# Patient Record
Sex: FEMALE | Race: WHITE | NOT HISPANIC OR LATINO | Employment: OTHER | URBAN - METROPOLITAN AREA
[De-identification: names, ages, dates, MRNs, and addresses within clinical notes are randomized per-mention and may not be internally consistent; named-entity substitution may affect disease eponyms.]

---

## 2018-11-30 ENCOUNTER — TELEPHONE (OUTPATIENT)
Dept: ENDOCRINOLOGY | Facility: HOSPITAL | Age: 78
End: 2018-11-30

## 2018-11-30 ENCOUNTER — OFFICE VISIT (OUTPATIENT)
Dept: ENDOCRINOLOGY | Facility: HOSPITAL | Age: 78
End: 2018-11-30
Payer: COMMERCIAL

## 2018-11-30 VITALS
BODY MASS INDEX: 39.91 KG/M2 | HEIGHT: 57 IN | SYSTOLIC BLOOD PRESSURE: 148 MMHG | HEART RATE: 76 BPM | DIASTOLIC BLOOD PRESSURE: 82 MMHG | WEIGHT: 185 LBS

## 2018-11-30 DIAGNOSIS — C73 THYROID CANCER (HCC): Primary | ICD-10-CM

## 2018-11-30 DIAGNOSIS — E89.0 POSTOPERATIVE HYPOTHYROIDISM: ICD-10-CM

## 2018-11-30 PROCEDURE — 99204 OFFICE O/P NEW MOD 45 MIN: CPT | Performed by: INTERNAL MEDICINE

## 2018-11-30 RX ORDER — LISINOPRIL 10 MG/1
20 TABLET ORAL DAILY
Refills: 1 | COMMUNITY
Start: 2018-09-17

## 2018-11-30 RX ORDER — BUDESONIDE AND FORMOTEROL FUMARATE DIHYDRATE 160; 4.5 UG/1; UG/1
AEROSOL RESPIRATORY (INHALATION)
Refills: 5 | COMMUNITY
Start: 2018-10-08

## 2018-11-30 RX ORDER — ALENDRONATE SODIUM 70 MG/1
70 TABLET ORAL WEEKLY
Refills: 3 | COMMUNITY
Start: 2018-10-08

## 2018-11-30 RX ORDER — LEVOTHYROXINE SODIUM 112 UG/1
112 TABLET ORAL DAILY
Refills: 3 | COMMUNITY
Start: 2018-11-08 | End: 2018-11-30 | Stop reason: SDUPTHER

## 2018-11-30 RX ORDER — LEVOTHYROXINE SODIUM 112 UG/1
112 TABLET ORAL DAILY
Qty: 90 TABLET | Refills: 3 | Status: SHIPPED | OUTPATIENT
Start: 2018-11-30 | End: 2019-12-05 | Stop reason: SDUPTHER

## 2018-11-30 RX ORDER — SIMVASTATIN 20 MG
20 TABLET ORAL
Refills: 1 | COMMUNITY
Start: 2018-09-27

## 2018-11-30 RX ORDER — PREDNISOLONE ACETATE 10 MG/ML
1 SUSPENSION/ DROPS OPHTHALMIC 2 TIMES DAILY
COMMUNITY

## 2018-11-30 NOTE — LETTER
November 30, 2018     Beth Chapin DO  [facility] [address]    Patient: Herb Ortega   YOB: 1940   Date of Visit: 11/30/2018       Dear Dr Vladimir Avitia: Thank you for referring Jamel Garcia to me for evaluation  Below are my notes for this consultation  If you have questions, please do not hesitate to call me  I look forward to following your patient along with you  Sincerely,        Hiwot Galvin DO        CC: No Recipients  Hiwot Galvin DO  11/30/2018  9:41 AM  Sign at close encounter  11/30/2018    Assessment/Plan      Diagnoses and all orders for this visit:    Thyroid cancer ([facility] [address] )  -     Thyroglobulin; Future  -     Anti-thyroglobulin antibody; Future  -     US thyroid; Future  -     Thyroglobulin; Future  -     Anti-thyroglobulin antibody; Future    Postoperative hypothyroidism  -     TSH, 3rd generation Lab Collect; Future  -     T4, free Lab Collect; Future  -     levothyroxine 112 mcg tablet; Take 1 tablet (112 mcg total) by mouth daily    Other orders  -     lisinopril (ZESTRIL) 10 mg tablet; Take 10 mg by mouth daily  -     simvastatin (ZOCOR) 20 mg tablet; Take 20 mg by mouth daily at bedtime  -     Discontinue: levothyroxine 112 mcg tablet; Take 112 mcg by mouth daily  -     alendronate (FOSAMAX) 70 mg tablet; Take 70 mg by mouth once a week  -     SYMBICORT 160-4 5 MCG/ACT inhaler; 2 PUFF INHALATION DAILY  -     prednisoLONE acetate (PRED FORTE) 1 % ophthalmic suspension; 1 drop 2 (two) times a day        Assessment/Plan:  1  Papillary thyroid cancer: The lab did not run a thyroglobulin quantitative level  I have asked the patient to go back for thyroglobulin quantitative and thyroglobulin antibody level  We will call her with these results  As long as this level looks negative, we will plan to see her back in 1 year with a TSH, free T4, thyroglobulin quantitative, thyroglobulin antibody, neck ultrasound just prior    Will acquire surgical pathology from [facility]     2   Hypothyroidism:  Continue levothyroxine 112 mcg daily  CC:   Thyroid cancer    History of Present Illness     HPI: Ashley Tang is a 69y o  year old female with history of hyperlipidemia, asthma, hypertension, postoperative hypothyroidism, osteoporosis, papillary thyroid cancer presents to establish care  Previously followed with doctor Hernandez  In 2016 she had a neck mass noted  She had a thyroid nodule biopsied showing papillary thyroid cancer  She then underwent subtotal thyroidectomy at [facility]   She did not receive radioactive iodine  She has then underwent monitoring with ultrasound evaluations and blood work  She presents today to establish care  She does have a history of breast cancer 2009 with treatment including radiation  Overall she feels okay  She denies any changes in how she is feeling and denies new health issues  She states there is some stress in her life lately as her son who she lives with his currently going through some health issues and over a year ago she had her house for close  She follows with the retina specialist as well as her family doctor closely  She denies known family history of thyroid cancer  She denies any neck compressive symptoms  She denies any symptoms referable to hypothyroidism or hyperthyroidism  She is maintained on levothyroxine 112 mcg daily  Review of Systems   Constitutional: Negative for fatigue  HENT: Negative for trouble swallowing and voice change  Eyes: Negative for visual disturbance  Respiratory: Negative for shortness of breath  Cardiovascular: Negative for palpitations and leg swelling  Gastrointestinal: Negative for abdominal pain, nausea and vomiting  Endocrine: Negative for cold intolerance, heat intolerance, polydipsia and polyuria  Musculoskeletal: Negative for arthralgias and myalgias  Skin: Negative for rash     Neurological: Negative for dizziness, tremors and weakness  Hematological: Negative for adenopathy  Psychiatric/Behavioral: Negative for agitation and confusion  Historical Information   History reviewed  No pertinent past medical history  History reviewed  No pertinent surgical history  Social History   History   Alcohol use Not on file     History   Drug use: Unknown     History   Smoking Status    Never Smoker   Smokeless Tobacco    Never Used     Family History:   Family History   Problem Relation Age of Onset    Heart disease Mother     Heart disease Father     Lupus Sister        Meds/Allergies   Current Outpatient Prescriptions   Medication Sig Dispense Refill    alendronate (FOSAMAX) 70 mg tablet Take 70 mg by mouth once a week  3    levothyroxine 112 mcg tablet Take 1 tablet (112 mcg total) by mouth daily 90 tablet 3    lisinopril (ZESTRIL) 10 mg tablet Take 10 mg by mouth daily  1    prednisoLONE acetate (PRED FORTE) 1 % ophthalmic suspension 1 drop 2 (two) times a day      simvastatin (ZOCOR) 20 mg tablet Take 20 mg by mouth daily at bedtime  1    SYMBICORT 160-4 5 MCG/ACT inhaler 2 PUFF INHALATION DAILY  5     No current facility-administered medications for this visit  Allergies   Allergen Reactions    Amoxicillin GI Intolerance       Objective   Vitals: Blood pressure 148/82, pulse 76, height 4' 9" (1 448 m), weight 83 9 kg (185 lb)  Invasive Devices          No matching active lines, drains, or airways          Physical Exam   Constitutional: She is oriented to person, place, and time  She appears well-developed and well-nourished  No distress  HENT:   Head: Normocephalic and atraumatic  Eyes: Pupils are equal, round, and reactive to light  Conjunctivae are normal    Neck: Normal range of motion  Neck supple  No thyromegaly present  Cardiovascular: Normal rate and regular rhythm  Pulmonary/Chest: Effort normal and breath sounds normal  No respiratory distress  Abdominal: Soft   Bowel sounds are normal  She exhibits no distension  Musculoskeletal: Normal range of motion  She exhibits no edema  Neurological: She is alert and oriented to person, place, and time  She exhibits normal muscle tone  Skin: Skin is warm and dry  No rash noted  She is not diaphoretic  Psychiatric: She has a normal mood and affect  Her behavior is normal    Vitals reviewed  The history was obtained from the review of the chart and from the patient  Lab Results:      Ultrasound of the neck from 10/30/2018 at [facility]:  Residual thyroid tissue in the left thyroid bed measuring 1 1 x 0 4 x 0 5 cm  Three lymph nodes measure in the right neck in 3 lymph nodes measure in the left neck  Labs from [address] [place] [address] from 10/30/2018:  Glucose 125, BUN 16, creatinine 0 75, GFR 77, sodium 143, potassium 4 1, albumin 4 2, bilirubin 0 3, alk phos 88, AST 17, ALT 13, TSH 0 851, total T4 11 4, free thyroxine index 3 1, thyroglobulin antibody less than 1  No future appointments  Portions of the record may have been created with voice recognition software  Occasional wrong word or "sound a like" substitutions may have occurred due to the inherent limitations of voice recognition software  Read the chart carefully and recognize, using context, where substitutions have occurred

## 2018-11-30 NOTE — TELEPHONE ENCOUNTER
Spoke to someone at the PCP office and they are faxing what they have  We will have it on your desk but the person I talked to didn't seem to know what she was looking for  If it's not what you need I will call [facility] Monday

## 2018-11-30 NOTE — PROGRESS NOTES
11/30/2018    Assessment/Plan      Diagnoses and all orders for this visit:    Thyroid cancer ([facility] [address] )  -     Thyroglobulin; Future  -     Anti-thyroglobulin antibody; Future  -     US thyroid; Future  -     Thyroglobulin; Future  -     Anti-thyroglobulin antibody; Future    Postoperative hypothyroidism  -     TSH, 3rd generation Lab Collect; Future  -     T4, free Lab Collect; Future  -     levothyroxine 112 mcg tablet; Take 1 tablet (112 mcg total) by mouth daily    Other orders  -     lisinopril (ZESTRIL) 10 mg tablet; Take 10 mg by mouth daily  -     simvastatin (ZOCOR) 20 mg tablet; Take 20 mg by mouth daily at bedtime  -     Discontinue: levothyroxine 112 mcg tablet; Take 112 mcg by mouth daily  -     alendronate (FOSAMAX) 70 mg tablet; Take 70 mg by mouth once a week  -     SYMBICORT 160-4 5 MCG/ACT inhaler; 2 PUFF INHALATION DAILY  -     prednisoLONE acetate (PRED FORTE) 1 % ophthalmic suspension; 1 drop 2 (two) times a day        Assessment/Plan:  1  Papillary thyroid cancer: The lab did not run a thyroglobulin quantitative level  I have asked the patient to go back for thyroglobulin quantitative and thyroglobulin antibody level  We will call her with these results  As long as this level looks negative, we will plan to see her back in 1 year with a TSH, free T4, thyroglobulin quantitative, thyroglobulin antibody, neck ultrasound just prior  Will acquire surgical pathology from [facility]     2   Hypothyroidism:  Continue levothyroxine 112 mcg daily  ADDENDUM: Surgical pathology 11/17/16:   Thyroid lobectomy in subtotal left lobectomy specimen:  Papillary thyroid cancer 3 2 cm of right lobe confined to the thyroid  Margins are free of tumor  No lymphovascular invasion  Nodular goiter of right lobe  Architecture was classical papillary  There was no capsular invasion and no lymph:  Vascular invasion and no extrathyroidal extension      CC:   Thyroid cancer    History of Present Illness HPI: Doyle Barragan is a 69y o  year old female with history of hyperlipidemia, asthma, hypertension, postoperative hypothyroidism, osteoporosis, papillary thyroid cancer presents to establish care  Previously followed with doctor David  In 2016 she had a neck mass noted  She had a thyroid nodule biopsied showing papillary thyroid cancer  She then underwent subtotal thyroidectomy at [facility]   She did not receive radioactive iodine  She has then underwent monitoring with ultrasound evaluations and blood work  She presents today to establish care  She does have a history of breast cancer 2009 with treatment including radiation  Overall she feels okay  She denies any changes in how she is feeling and denies new health issues  She states there is some stress in her life lately as her son who she lives with his currently going through some health issues and over a year ago she had her house for close  She follows with the retina specialist as well as her family doctor closely  She denies known family history of thyroid cancer  She denies any neck compressive symptoms  She denies any symptoms referable to hypothyroidism or hyperthyroidism  She is maintained on levothyroxine 112 mcg daily  Review of Systems   Constitutional: Negative for fatigue  HENT: Negative for trouble swallowing and voice change  Eyes: Negative for visual disturbance  Respiratory: Negative for shortness of breath  Cardiovascular: Negative for palpitations and leg swelling  Gastrointestinal: Negative for abdominal pain, nausea and vomiting  Endocrine: Negative for cold intolerance, heat intolerance, polydipsia and polyuria  Musculoskeletal: Negative for arthralgias and myalgias  Skin: Negative for rash  Neurological: Negative for dizziness, tremors and weakness  Hematological: Negative for adenopathy  Psychiatric/Behavioral: Negative for agitation and confusion         Historical Information   History reviewed  No pertinent past medical history  History reviewed  No pertinent surgical history  Social History   History   Alcohol use Not on file     History   Drug use: Unknown     History   Smoking Status    Never Smoker   Smokeless Tobacco    Never Used     Family History:   Family History   Problem Relation Age of Onset    Heart disease Mother     Heart disease Father     Lupus Sister        Meds/Allergies   Current Outpatient Prescriptions   Medication Sig Dispense Refill    alendronate (FOSAMAX) 70 mg tablet Take 70 mg by mouth once a week  3    levothyroxine 112 mcg tablet Take 1 tablet (112 mcg total) by mouth daily 90 tablet 3    lisinopril (ZESTRIL) 10 mg tablet Take 10 mg by mouth daily  1    prednisoLONE acetate (PRED FORTE) 1 % ophthalmic suspension 1 drop 2 (two) times a day      simvastatin (ZOCOR) 20 mg tablet Take 20 mg by mouth daily at bedtime  1    SYMBICORT 160-4 5 MCG/ACT inhaler 2 PUFF INHALATION DAILY  5     No current facility-administered medications for this visit  Allergies   Allergen Reactions    Amoxicillin GI Intolerance       Objective   Vitals: Blood pressure 148/82, pulse 76, height 4' 9" (1 448 m), weight 83 9 kg (185 lb)  Invasive Devices          No matching active lines, drains, or airways          Physical Exam   Constitutional: She is oriented to person, place, and time  She appears well-developed and well-nourished  No distress  HENT:   Head: Normocephalic and atraumatic  Eyes: Pupils are equal, round, and reactive to light  Conjunctivae are normal    Neck: Normal range of motion  Neck supple  No thyromegaly present  Cardiovascular: Normal rate and regular rhythm  Pulmonary/Chest: Effort normal and breath sounds normal  No respiratory distress  Abdominal: Soft  Bowel sounds are normal  She exhibits no distension  Musculoskeletal: Normal range of motion  She exhibits no edema     Neurological: She is alert and oriented to person, place, and time  She exhibits normal muscle tone  Skin: Skin is warm and dry  No rash noted  She is not diaphoretic  Psychiatric: She has a normal mood and affect  Her behavior is normal    Vitals reviewed  The history was obtained from the review of the chart and from the patient  Lab Results:      Ultrasound of the neck from 10/30/2018 at [facility]:  Residual thyroid tissue in the left thyroid bed measuring 1 1 x 0 4 x 0 5 cm  Three lymph nodes measure in the right neck in 3 lymph nodes measure in the left neck  Labs from [address] [place] [address] from 10/30/2018:  Glucose 125, BUN 16, creatinine 0 75, GFR 77, sodium 143, potassium 4 1, albumin 4 2, bilirubin 0 3, alk phos 88, AST 17, ALT 13, TSH 0 851, total T4 11 4, free thyroxine index 3 1, thyroglobulin antibody less than 1  No future appointments  Portions of the record may have been created with voice recognition software  Occasional wrong word or "sound a like" substitutions may have occurred due to the inherent limitations of voice recognition software  Read the chart carefully and recognize, using context, where substitutions have occurred

## 2018-12-12 LAB
THYROGLOB AB SERPL-ACNC: <1 IU/ML (ref 0–0.9)
THYROGLOB SERPL-MCNC: <2 NG/ML

## 2019-03-15 ENCOUNTER — TELEPHONE (OUTPATIENT)
Dept: ENDOCRINOLOGY | Facility: CLINIC | Age: 79
End: 2019-03-15

## 2019-04-19 ENCOUNTER — TELEPHONE (OUTPATIENT)
Dept: ENDOCRINOLOGY | Facility: HOSPITAL | Age: 79
End: 2019-04-19

## 2019-07-16 ENCOUNTER — TELEPHONE (OUTPATIENT)
Dept: ENDOCRINOLOGY | Facility: CLINIC | Age: 79
End: 2019-07-16

## 2019-11-27 LAB
T4 FREE SERPL-MCNC: 2.02 NG/DL (ref 0.82–1.77)
THYROGLOB AB SERPL-ACNC: <1 IU/ML (ref 0–0.9)
THYROGLOB SERPL-MCNC: 1.5 NG/ML (ref 1.5–38.5)
TSH SERPL DL<=0.005 MIU/L-ACNC: 0.3 UIU/ML (ref 0.45–4.5)

## 2019-12-05 ENCOUNTER — OFFICE VISIT (OUTPATIENT)
Dept: ENDOCRINOLOGY | Facility: HOSPITAL | Age: 79
End: 2019-12-05
Payer: COMMERCIAL

## 2019-12-05 VITALS
BODY MASS INDEX: 37.88 KG/M2 | SYSTOLIC BLOOD PRESSURE: 128 MMHG | WEIGHT: 175.6 LBS | HEART RATE: 80 BPM | DIASTOLIC BLOOD PRESSURE: 66 MMHG | HEIGHT: 57 IN

## 2019-12-05 DIAGNOSIS — E89.0 POSTOPERATIVE HYPOTHYROIDISM: Primary | ICD-10-CM

## 2019-12-05 DIAGNOSIS — C73 THYROID CANCER (HCC): ICD-10-CM

## 2019-12-05 PROCEDURE — 99214 OFFICE O/P EST MOD 30 MIN: CPT | Performed by: INTERNAL MEDICINE

## 2019-12-05 RX ORDER — LEVOTHYROXINE SODIUM 112 UG/1
112 TABLET ORAL DAILY
Qty: 90 TABLET | Refills: 3 | Status: SHIPPED | OUTPATIENT
Start: 2019-12-05 | End: 2020-06-24 | Stop reason: SDUPTHER

## 2019-12-05 NOTE — PROGRESS NOTES
12/5/2019    Assessment/Plan      Diagnoses and all orders for this visit:    Postoperative hypothyroidism  -     TSH, 3rd generation; Future  -     T4, free; Future  -     levothyroxine 112 mcg tablet; Take 1 tablet (112 mcg total) by mouth daily    Thyroid cancer ([address] )  -     US head neck lymph node mapping; Future  -     Thyroglobulin; Future  -     Anti-thyroglobulin antibody; Future        Assessment/Plan:  1  Papillary thyroid cancer:  Thyroglobulin remains low  Prior laboratory values could only detect values to a limit of less than 2  The laboratory sensitivity appears to have improved and now is reading at 1 5 which I would still consider low especially in the setting of not having radioactive iodine as well as having residual thyroid tissue  Ultrasound evaluation suggested enlarged an abnormal lymph nodes  I wonder if this is more related to allergies as well as recent infection  I have asked her to have her lymph node mapping repeated at [facility] so I can review with our radiologist prior to sending her for a biopsy  I discussed that is reassuring that I am not palpating any lymphadenopathy as well as her thyroglobulin remains low  We will continue her TSH suppression at the current level until things sort themselves out  If the ultrasound suggest a biopsy, we will ranges  If it does not will repeat labs as ordered above prior to next appointment which will be in 4 months  2  Postoperative hypothyroidism:  Plan as above  CC:  Follow-up    History of Present Illness     HPI: Pili Lopez is a 67y o  year old female with history of papillary thyroid cancer as well as postoperative hypothyroidism presents for a follow-up appointment  In 2016, she had a neck mass noted  She underwent biopsy which showed papillary thyroid cancer    Subtotal thyroidectomy was done on 11/17/2016 at [facility] with specimen showing thyroid lobectomy and subtotal left lobectomy with a papillary thyroid cancer 3 2 cm in the right lobe confined to the thyroid with the margins free of tumor  There was no lymphovascular invasion  The right thyroid showed nodular goiter  Architecture was classical papillary thyroid cancer  No capsular invasion and no lymph nodes involved or vascular invasion and no extrathyroidal extension  She did not receive radioactive iodine treatment  Of note she does have a history of breast cancer in 2009 which included radiation  She is maintained on levothyroxine 112 mcg daily  Presents today overall feeling well  She has had a recent viral upper respiratory infection as well as allergies over the fall  No neck compressive symptoms  She has had a raspy voice related to her allergies which she gets often  Review of Systems   Constitutional: Negative for fatigue  HENT: Negative for trouble swallowing and voice change  Eyes: Negative for visual disturbance  Respiratory: Negative for shortness of breath  Cardiovascular: Negative for palpitations and leg swelling  Gastrointestinal: Negative for abdominal pain, nausea and vomiting  Endocrine: Negative for polydipsia and polyuria  Musculoskeletal: Negative for arthralgias and myalgias  Skin: Negative for rash  Neurological: Negative for dizziness, tremors and weakness  Hematological: Negative for adenopathy  Psychiatric/Behavioral: Negative for agitation and confusion  Historical Information   History reviewed  No pertinent past medical history  History reviewed  No pertinent surgical history    Social History   Social History     Substance and Sexual Activity   Alcohol Use Not on file     Social History     Substance and Sexual Activity   Drug Use Not on file     Social History     Tobacco Use   Smoking Status Never Smoker   Smokeless Tobacco Never Used     Family History:   Family History   Problem Relation Age of Onset    Heart disease Mother     Heart disease Father     Lupus Sister Meds/Allergies   Current Outpatient Medications   Medication Sig Dispense Refill    levothyroxine 112 mcg tablet Take 1 tablet (112 mcg total) by mouth daily 90 tablet 3    lisinopril (ZESTRIL) 10 mg tablet Take 20 mg by mouth daily   1    prednisoLONE acetate (PRED FORTE) 1 % ophthalmic suspension 1 drop 2 (two) times a day      simvastatin (ZOCOR) 20 mg tablet Take 20 mg by mouth daily at bedtime  1    SYMBICORT 160-4 5 MCG/ACT inhaler 2 PUFF INHALATION DAILY  5    alendronate (FOSAMAX) 70 mg tablet Take 70 mg by mouth once a week  3     No current facility-administered medications for this visit  Allergies   Allergen Reactions    Amoxicillin GI Intolerance       Objective   Vitals: Blood pressure 128/66, pulse 80, height 4' 9" (1 448 m), weight 79 7 kg (175 lb 9 6 oz)  Invasive Devices     None                 Physical Exam   Constitutional: She is oriented to person, place, and time  She appears well-developed and well-nourished  No distress  HENT:   Head: Normocephalic and atraumatic  Neck: Normal range of motion  Neck supple  No thyromegaly present  Cardiovascular: Normal rate and regular rhythm  Pulmonary/Chest: Effort normal and breath sounds normal  No respiratory distress  Abdominal: Soft  Bowel sounds are normal    Musculoskeletal: Normal range of motion  She exhibits no edema  Neurological: She is alert and oriented to person, place, and time  She exhibits normal muscle tone  Skin: Skin is warm and dry  No rash noted  She is not diaphoretic  Psychiatric: She has a normal mood and affect  Her behavior is normal    Vitals reviewed  The history was obtained from the review of the chart and from the patient      Lab Results:      Recent Results (from the past 29009 hour(s))   Thyroglobulin TG-SALEEM    Collection Time: 12/05/18 11:19 AM   Result Value Ref Range    THYROGLOBULIN (TG-SALEEM) <2 0 ng/mL   Anti-thyroglobulin antibody    Collection Time: 12/05/18 11:19 AM Result Value Ref Range    Thyroglobulin Ab <1 0 0 0 - 0 9 IU/mL   T4, free    Collection Time: 11/26/19  9:12 AM   Result Value Ref Range    Free t4 2 02 (H) 0 82 - 1 77 ng/dL   TSH, 3rd generation    Collection Time: 11/26/19  9:12 AM   Result Value Ref Range    TSH 0 297 (L) 0 450 - 4 500 uIU/mL   TGAB+THYROGLOBULIN,ANNELISE OR LCMS    Collection Time: 11/26/19  9:12 AM   Result Value Ref Range    Thyroglobulin Ab <1 0 0 0 - 0 9 IU/mL   Thyroglobulin by ANNELISE    Collection Time: 11/26/19  9:12 AM   Result Value Ref Range    Thyroglobulin-ANNELISE 1 5 1 5 - 38 5 ng/mL     Ultrasound of the neck done on 11/26/2019 at [facility] showed some abnormal lymph nodes which are new since prior evaluation  No future appointments  Portions of the record may have been created with voice recognition software  Occasional wrong word or "sound a like" substitutions may have occurred due to the inherent limitations of voice recognition software  Read the chart carefully and recognize, using context, where substitutions have occurred

## 2019-12-20 DIAGNOSIS — C73 THYROID CANCER (HCC): Primary | ICD-10-CM

## 2020-02-10 ENCOUNTER — TELEPHONE (OUTPATIENT)
Dept: ENDOCRINOLOGY | Facility: HOSPITAL | Age: 80
End: 2020-02-10

## 2020-02-10 NOTE — TELEPHONE ENCOUNTER
Eduard Nina from Dr Selene Kline office called, she notes that Dr Km Avalos would like the patient to have a Thyrogen stimulated I131 whole body scan done for her recurrent thyroid cancer  They would like to know if this is something we want to set up or if you would like them to set this up for her?

## 2020-02-12 NOTE — TELEPHONE ENCOUNTER
Spoke to Oh Cadet, she would like some clarification on this if you are able to give her a call back  The number for Dr Pati Burroughs office is [phone] Option 1

## 2020-02-12 NOTE — TELEPHONE ENCOUNTER
We can certainly arrange it to be done at [facility] but when I spoke to the patient she was hoping to have it done at [facility] given proximity

## 2020-02-13 ENCOUNTER — TELEPHONE (OUTPATIENT)
Dept: ENDOCRINOLOGY | Facility: HOSPITAL | Age: 80
End: 2020-02-13

## 2020-02-13 NOTE — TELEPHONE ENCOUNTER
Can we reach out to the patient regarding the iodine scan that Dr Montana Cai wants to order? We need to give thyrogen injections prior to the scan but they do not give them at their office and we do not at our office  They would be given through our nuclear medicine department and if that is the case, our nuclear medicine department should just do the scan as well  If the patient is ok with that, I will have the nuclear medicine department reach out to her to arrange

## 2020-02-13 NOTE — TELEPHONE ENCOUNTER
Patient is agreeable to you reaching out to Nuclear Medicine  As long as she knows what her cost would be before she has anything done

## 2020-02-17 ENCOUNTER — HOSPITAL ENCOUNTER (OUTPATIENT)
Dept: NUCLEAR MEDICINE | Facility: HOSPITAL | Age: 80
Discharge: HOME | End: 2020-02-17

## 2020-02-17 DIAGNOSIS — C73 MALIGNANT NEOPLASM OF THYROID GLAND (HCC): ICD-10-CM

## 2020-03-06 ENCOUNTER — TELEPHONE (OUTPATIENT)
Dept: ENDOCRINOLOGY | Facility: HOSPITAL | Age: 80
End: 2020-03-06

## 2020-03-06 DIAGNOSIS — C73 THYROID CANCER (HCC): Primary | ICD-10-CM

## 2020-03-06 NOTE — TELEPHONE ENCOUNTER
Nuc med at [address] [facility] [address] called, they need a script in epic for patient for her thyroglobulin to be drawn  The one in the system has a date of April and there are other labs attached to it  Can a new one be entered without a hold date for nuc med?

## 2020-03-09 ENCOUNTER — HOSPITAL ENCOUNTER (OUTPATIENT)
Dept: NUCLEAR MEDICINE | Facility: HOSPITAL | Age: 80
Discharge: HOME | End: 2020-03-09
Payer: COMMERCIAL

## 2020-03-09 DIAGNOSIS — C73 MALIGNANT NEOPLASM OF THYROID GLAND (HCC): ICD-10-CM

## 2020-03-09 PROCEDURE — 78018 THYROID MET IMAGING BODY: CPT

## 2020-03-09 PROCEDURE — A9509 IODINE I-123 SOD IODIDE MIL: HCPCS

## 2020-03-09 RX ADMIN — THYROTROPIN ALFA 0.9 MG: 0.9 INJECTION, POWDER, FOR SOLUTION INTRAMUSCULAR at 09:32

## 2020-03-09 NOTE — NURSING NOTE
Received pt for Thyrogen injection in Nuclear Medicine department  Pt states she has a mild UTI  Nuclear Medicine technologist spoke with Dr Nkechi MCKEON to proceed with scheduled thyrogen injection  Pt is postmenopausal, no pregnancy  Nuclear med tech reviewed testing schedule with patient  Thyrogen injection procedure and possible side effects reviewed with patient  Pt verbalizes understanding of above  Thyrogen reconstituted as per package insert  See MAR for lot number and expiration date  Thyrogen 0 9 mg given IM in right buttocks  Pt tolerated injection well, site is without redness or drainage  Pt verbalizes understanding to return tomorrow for second thyrogen injection

## 2020-03-10 ENCOUNTER — HOSPITAL ENCOUNTER (OUTPATIENT)
Dept: NUCLEAR MEDICINE | Facility: HOSPITAL | Age: 80
Discharge: HOME | End: 2020-03-10
Payer: COMMERCIAL

## 2020-03-10 RX ADMIN — THYROTROPIN ALFA 0.9 MG: 0.9 INJECTION, POWDER, FOR SOLUTION INTRAMUSCULAR at 09:06

## 2020-03-11 ENCOUNTER — HOSPITAL ENCOUNTER (OUTPATIENT)
Dept: NUCLEAR MEDICINE | Facility: HOSPITAL | Age: 80
Discharge: HOME | End: 2020-03-11
Payer: COMMERCIAL

## 2020-03-16 LAB
THYROGLOB AB SERPL-ACNC: <1 IU/ML (ref 0–0.9)
THYROGLOB SERPL-MCNC: 24.1 NG/ML (ref 1.5–38.5)
TSH SERPL DL<=0.005 MIU/L-ACNC: 25.74 UIU/ML (ref 0.45–4.5)

## 2020-04-15 ENCOUNTER — TELEMEDICINE (OUTPATIENT)
Dept: ENDOCRINOLOGY | Facility: HOSPITAL | Age: 80
End: 2020-04-15
Payer: COMMERCIAL

## 2020-04-15 DIAGNOSIS — C73 THYROID CANCER (HCC): Primary | ICD-10-CM

## 2020-04-15 DIAGNOSIS — E89.0 POSTOPERATIVE HYPOTHYROIDISM: ICD-10-CM

## 2020-04-15 PROCEDURE — 99213 OFFICE O/P EST LOW 20 MIN: CPT | Performed by: INTERNAL MEDICINE

## 2020-04-15 RX ORDER — DORZOLAMIDE HYDROCHLORIDE AND TIMOLOL MALEATE 20; 5 MG/ML; MG/ML
SOLUTION/ DROPS OPHTHALMIC
COMMUNITY
Start: 2020-03-23

## 2020-04-15 RX ORDER — BIMATOPROST 0.01 %
DROPS OPHTHALMIC (EYE)
COMMUNITY
Start: 2020-03-24

## 2020-06-12 ENCOUNTER — TELEPHONE (OUTPATIENT)
Dept: ENDOCRINOLOGY | Facility: HOSPITAL | Age: 80
End: 2020-06-12

## 2020-06-15 LAB
T4 FREE SERPL-MCNC: 1.71 NG/DL (ref 0.82–1.77)
THYROGLOB AB SERPL-ACNC: <1 IU/ML (ref 0–0.9)
THYROGLOB SERPL-MCNC: 1.2 NG/ML (ref 1.5–38.5)
TSH SERPL DL<=0.005 MIU/L-ACNC: 0.67 UIU/ML (ref 0.45–4.5)

## 2020-06-24 ENCOUNTER — TELEPHONE (OUTPATIENT)
Dept: ENDOCRINOLOGY | Facility: HOSPITAL | Age: 80
End: 2020-06-24

## 2020-06-24 DIAGNOSIS — C73 THYROID CANCER (HCC): Primary | ICD-10-CM

## 2020-06-24 DIAGNOSIS — E89.0 POSTOPERATIVE HYPOTHYROIDISM: ICD-10-CM

## 2020-06-24 RX ORDER — LEVOTHYROXINE SODIUM 112 UG/1
TABLET ORAL
Qty: 90 TABLET | Refills: 3
Start: 2020-06-24 | End: 2020-07-09 | Stop reason: SDUPTHER

## 2020-07-09 ENCOUNTER — TELEPHONE (OUTPATIENT)
Dept: ENDOCRINOLOGY | Facility: HOSPITAL | Age: 80
End: 2020-07-09

## 2020-07-09 DIAGNOSIS — E89.0 POSTOPERATIVE HYPOTHYROIDISM: ICD-10-CM

## 2020-07-09 RX ORDER — LEVOTHYROXINE SODIUM 112 UG/1
TABLET ORAL
Qty: 90 TABLET | Refills: 0
Start: 2020-07-09 | End: 2020-10-19 | Stop reason: SDUPTHER

## 2020-07-09 NOTE — TELEPHONE ENCOUNTER
I spoke to Dr Thania Mckenzie  He does not feel radioactive iodine is appropriate for treatment of this patient's thyroid cancer which has metastasized to the neck  I called and spoke to the patient and noted that at this time, the 2 main options we have include considering surgical evaluation for local regional disease vs monitoring lab work over time  The patient would really prefer to monitor over time which I think is reasonable  She has labs ordered for before her next appointment which is scheduled in October

## 2020-07-09 NOTE — TELEPHONE ENCOUNTER
Sarai Mo from Dr [facility] office called  He would like to speak to you about this patient  Please call him back at [phone]  FYI:  Don't leave a message if they don't answer  Their machine is not working & they won't get the message

## 2020-07-10 ENCOUNTER — TELEPHONE (OUTPATIENT)
Dept: ENDOCRINOLOGY | Facility: HOSPITAL | Age: 80
End: 2020-07-10

## 2020-07-10 NOTE — TELEPHONE ENCOUNTER
Spoke to patient  I will reach out to [facility] and see if they can help as I am not entirely sure what I can do in this regard

## 2020-07-10 NOTE — TELEPHONE ENCOUNTER
Received call from patient  She requests to speak with you regarding a bill? She said you were aware of this and she could ask for your help when necessary

## 2020-09-29 LAB — THYROGLOB AB SERPL-ACNC: <1 IU/ML (ref 0–0.9)

## 2020-10-01 LAB
THYROGLOB AB SERPL-ACNC: <1 IU/ML (ref 0–0.9)
THYROGLOB SERPL-MCNC: 2.1 NG/ML (ref 1.5–38.5)

## 2020-10-19 ENCOUNTER — OFFICE VISIT (OUTPATIENT)
Dept: ENDOCRINOLOGY | Facility: HOSPITAL | Age: 80
End: 2020-10-19
Payer: COMMERCIAL

## 2020-10-19 VITALS
WEIGHT: 172.2 LBS | DIASTOLIC BLOOD PRESSURE: 68 MMHG | BODY MASS INDEX: 37.15 KG/M2 | TEMPERATURE: 98.7 F | SYSTOLIC BLOOD PRESSURE: 110 MMHG | HEART RATE: 80 BPM | HEIGHT: 57 IN

## 2020-10-19 DIAGNOSIS — C73 THYROID CANCER (HCC): ICD-10-CM

## 2020-10-19 DIAGNOSIS — E89.0 POSTOPERATIVE HYPOTHYROIDISM: Primary | ICD-10-CM

## 2020-10-19 PROCEDURE — 99214 OFFICE O/P EST MOD 30 MIN: CPT | Performed by: INTERNAL MEDICINE

## 2020-10-19 RX ORDER — LEVOTHYROXINE SODIUM 112 UG/1
TABLET ORAL
Qty: 102 TABLET | Refills: 3 | Status: SHIPPED | OUTPATIENT
Start: 2020-10-19

## 2020-10-19 RX ORDER — LEVOTHYROXINE SODIUM 112 UG/1
TABLET ORAL
Qty: 96 TABLET | Refills: 3 | Status: SHIPPED | OUTPATIENT
Start: 2020-10-19 | End: 2020-10-19 | Stop reason: SDUPTHER

## 2020-12-02 ENCOUNTER — TELEPHONE (OUTPATIENT)
Dept: ENDOCRINOLOGY | Facility: HOSPITAL | Age: 80
End: 2020-12-02

## 2022-07-29 ENCOUNTER — DOCUMENTATION (OUTPATIENT)
Dept: HEMATOLOGY ONCOLOGY | Facility: CLINIC | Age: 82
End: 2022-07-29

## 2022-07-29 NOTE — PROGRESS NOTES
Intake received  Pt outreach to follow    08/05/22  Called pt to go over the services we offer got her voicemail left a message for pt to call me back

## 2022-08-01 ENCOUNTER — PATIENT OUTREACH (OUTPATIENT)
Dept: HEMATOLOGY ONCOLOGY | Facility: CLINIC | Age: 82
End: 2022-08-01

## 2022-08-01 NOTE — PROGRESS NOTES
Outside records requested from:[facility]   Pathology completed on:7/12/2022  Fax:[phone]  Phone:[phone]  Imaging completed on:7/2022  Fax:  Phone:[phone] [facility] will be uploaded in system

## 2022-08-04 ENCOUNTER — PATIENT OUTREACH (OUTPATIENT)
Dept: HEMATOLOGY ONCOLOGY | Facility: CLINIC | Age: 82
End: 2022-08-04

## 2022-08-04 NOTE — PROGRESS NOTES
Outside Pathology/Images records received from : [facility] sent to Pathology  attention Ligia Zaragoza or Jesus Galarza  Note routed to team

## 2022-08-05 ENCOUNTER — PATIENT OUTREACH (OUTPATIENT)
Dept: HEMATOLOGY ONCOLOGY | Facility: CLINIC | Age: 82
End: 2022-08-05

## 2022-08-12 ENCOUNTER — PATIENT OUTREACH (OUTPATIENT)
Dept: HEMATOLOGY ONCOLOGY | Facility: CLINIC | Age: 82
End: 2022-08-12

## 2022-08-12 NOTE — PROGRESS NOTES
Past thyroid images did not come through to our system, spoke with Isidro Cisneros at [facility] and she will send them again through [facility], if unable to be sent that way, the images will be mailed to us

## 2022-08-22 ENCOUNTER — TELEPHONE (OUTPATIENT)
Dept: ENDOCRINOLOGY | Facility: CLINIC | Age: 82
End: 2022-08-22

## 2022-08-22 ENCOUNTER — CONSULT (OUTPATIENT)
Dept: SURGICAL ONCOLOGY | Facility: CLINIC | Age: 82
End: 2022-08-22
Payer: COMMERCIAL

## 2022-08-22 VITALS
OXYGEN SATURATION: 99 % | BODY MASS INDEX: 35.38 KG/M2 | DIASTOLIC BLOOD PRESSURE: 82 MMHG | RESPIRATION RATE: 15 BRPM | HEART RATE: 71 BPM | WEIGHT: 164 LBS | SYSTOLIC BLOOD PRESSURE: 128 MMHG | HEIGHT: 57 IN | TEMPERATURE: 98 F

## 2022-08-22 DIAGNOSIS — C73 THYROID CANCER (HCC): Primary | ICD-10-CM

## 2022-08-22 PROCEDURE — 99203 OFFICE O/P NEW LOW 30 MIN: CPT | Performed by: SURGERY

## 2022-08-22 NOTE — LETTER
August 22, 2022     [facility]  [address] [facility]    Patient: Doyle Barragan   YOB: 1940   Date of Visit: 8/22/2022       Dear Dr Madiha Soliz: Thank you for referring Court Earing to me for evaluation  Below are my notes for this consultation  If you have questions, please do not hesitate to call me  I look forward to following your patient along with you  Sincerely,        Jaye Simon MD        CC: DO Rahel Stovall DO Lander Raspberry, MD  8/22/2022  9:47 AM  Sign when Signing Visit               Surgical Oncology Consult       [address] [facility] [place]  [address] [address]  [address] [facility]  1940  [id number]  [address] [facility] SURGICAL ONCOLOGY [facility]  [address] [id number] [phone]    Chief Complaint   Patient presents with    New Patient Visit       Assessment/Plan:    No problem-specific Assessment & Plan notes found for this encounter  Diagnoses and all orders for this visit:    Thyroid cancer ([facility] [address] )  -     US thyroid; Future  -     US head neck lymph node mapping; Future  -     Thyroglobulin, Quant w/ AB ([place]); Future  -     CT soft tissue neck w wo contrast; Future  -     Basic metabolic panel; Future    Other orders  -     fluticasone-salmeterol (ADVAIR HFA) 115-21 MCG/ACT inhaler; Inhale 2 puffs 2 (two) times a day Rinse mouth after use  Advance Care Planning/Advance Directives:  {MDA CLN ACP/AD:67973}    Oncology History   Thyroid cancer ([facility] [address] )   6/28/2021 Surgery    Slides reviewed by [facility] pathology:  1  Slides (6) labeled [id number] from [facility] (obtained on: 11/17/2016):   A   Thyroid, right, lobectomy:   -  Papillary thyroid carcinoma, tall cell variant, with necrosis (high grade papillary carcinoma),3 2 cm, unencapsulated,    - Background thyroid tissue with adenomatous nodule  B  Thyroid, left lobe, subtotal lobectomy:   - Thyroid tissue with adenomatous nodule  2  Slides (3) labeled  from [facility] (obtained on: 01/21/2020)   A  Lymph node, right neck, biopsy:   -  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; 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  8/8/2022 Biopsy    Neck, Soft Tissue Mass, Left neck Ultrasound-guided Needle Biopsy (3 slides [id number] [facility], collected 7/12/2022):  - Papillary thyroid carcinoma, focally invading fibrous tissue  History of Present Illness:  Patient is a 25-year-old woman with history of thyroid cancer treated with right lobectomy and subtotal left lobectomy  Recently, she is had swelling in the left neck which was thought to be a metastatic lymph node  Biopsy confirmed this  She is been referred for evaluation and treatment she did not receive radioactive iodine at time of surgery which was done in 2016  Review of Systems   Constitutional: Negative  HENT: Negative  Eyes: Negative  Respiratory: Negative  Cardiovascular: Negative  Gastrointestinal: Negative  Endocrine: Negative  Genitourinary: Negative  Musculoskeletal: Negative  Skin: Negative  Allergic/Immunologic: Negative  Neurological: Negative  Hematological: Positive for adenopathy  Psychiatric/Behavioral: Negative  Patient Active Problem List   Diagnosis    Postoperative hypothyroidism    Thyroid cancer ([facility] [address] )     No past medical history on file  Past Surgical History:   Procedure Laterality Date    US GUIDED THYROID BIOPSY  11/7/2016     Family History   Problem Relation Age of Onset    Heart disease Mother     Heart disease Father     Lupus Sister      Social History     Socioeconomic History    Marital status:       Spouse name: Not on file    Number of children: Not on file    Years of education: Not on file    Highest education level: Not on file   Occupational History    Not on file   Tobacco Use    Smoking status: Never Smoker    Smokeless tobacco: Never Used   Substance and Sexual Activity    Alcohol use: Not on file    Drug use: Not on file    Sexual activity: Not on file   Other Topics Concern    Not on file   Social History Narrative    Not on file     Social Determinants of Health     Financial Resource Strain: Not on file   Food Insecurity: Not on file   Transportation Needs: Not on file   Physical Activity: Not on file   Stress: Not on file   Social Connections: Not on file   Intimate Partner Violence: Not on file   Housing Stability: Not on file       Current Outpatient Medications:     dorzolamide-timolol (COSOPT) 22 3-6 8 MG/ML ophthalmic solution, INSTILL 1 DROP LEFT EYE 2 TIMES A DAY, Disp: , Rfl:     fluticasone-salmeterol (ADVAIR HFA) 115-21 MCG/ACT inhaler, Inhale 2 puffs 2 (two) times a day Rinse mouth after use , Disp: , Rfl:     levothyroxine 112 mcg tablet, 1 tab daily 6 days of the week, 2 tabs on sundays  , Disp: 102 tablet, Rfl: 3    lisinopril (ZESTRIL) 10 mg tablet, Take 20 mg by mouth daily , Disp: , Rfl: 1    LUMIGAN 0 01 % ophthalmic drops, INSTILL 1 DROP INTO LEFT EYE IN THE EVENING, Disp: , Rfl:     prednisoLONE acetate (PRED FORTE) 1 % ophthalmic suspension, 1 drop 2 (two) times a day, Disp: , Rfl:     simvastatin (ZOCOR) 20 mg tablet, Take 20 mg by mouth daily at bedtime, Disp: , Rfl: 1    alendronate (FOSAMAX) 70 mg tablet, Take 70 mg by mouth once a week (Patient not taking: Reported on 8/22/2022), Disp: , Rfl: 3    SYMBICORT 160-4 5 MCG/ACT inhaler, 2 PUFF INHALATION DAILY (Patient not taking: Reported on 8/22/2022), Disp: , Rfl: 5  Allergies   Allergen Reactions    Amoxicillin GI Intolerance     Vitals:    08/22/22 0849   BP: 128/82   Pulse: 71   Resp: 15   Temp: 98 °F (36 7 °C)   SpO2: 99%       Physical Exam  Constitutional:       Appearance: Normal appearance     HENT:      Head: Normocephalic and atraumatic  Right Ear: External ear normal       Left Ear: External ear normal    Eyes:      Extraocular Movements: Extraocular movements intact  Pupils: Pupils are equal, round, and reactive to light  Cardiovascular:      Rate and Rhythm: Normal rate and regular rhythm  Pulses: Normal pulses  Heart sounds: Normal heart sounds  Pulmonary:      Effort: Pulmonary effort is normal       Breath sounds: Normal breath sounds  Abdominal:      General: Abdomen is flat  Palpations: Abdomen is soft  Musculoskeletal:         General: Normal range of motion  Cervical back: Normal range of motion and neck supple  Skin:     General: Skin is warm and dry  Neurological:      General: No focal deficit present  Mental Status: She is alert and oriented to person, place, and time  Psychiatric:         Mood and Affect: Mood normal          Behavior: Behavior normal          Pathology:  Case Report   Surgical Pathology Report                         Case: [id number]                                    Authorizing Provider: Jose A Fox MD        Collected:           08/08/2022 [facility]               Ordering Location:     [facility]      Received:            08/08/2022 [facility]                                      Hospital Specialty                                                                                   Laboratory                                                                    Pathologist:           Keron Rhoades MD                                                          Specimen:    Neck, Soft Tissue Mass, Left neck Ultrasound-guided Biopsy ( 3 slides [id number], collected 7/12/2022)                                                        Final Diagnosis   A   Neck, Soft Tissue Mass, Left neck Ultrasound-guided Needle Biopsy (3 slides [id number] [facility], collected 7/12/2022):  - Papillary thyroid carcinoma, focally invading fibrous tissue        Electronically signed by Artemio Grier MD on 8/8/2022 at 11:08 AM   Note    I agree with the original pathologist's diagnosis  A copy of the final report is sent to Dr Belinda Levy on 8/8/22 @ 1105 hours  Labs:  Lab Results   Component Value Date    TSH 0 668 06/11/2020         Imaging  Outside CT scan of neck showing palpable mass and left neck increase in size compared to prior studies  This measures 2 8 x 2 6 x 1 7 cm  There are multiple enlarged lymph nodes seen bilaterally  In the right cervical chain there is a node measuring 6 x 15 mm  In the right cervical chain as a 2nd node measuring 2 8 cm  Anterior to delisa there were 2 lymph nodes that are 14 and 11 mm in size  Thyroid gland surgically absent  I reviewed the above laboratory and imaging data  Discussion/Summary:  Patient status post right thyroidectomy and subtotal left hemithyroidectomy based on outside op report  Now with left adenopathy, biopsy-proven papillary cancer  Right side unaccounted for  Patient may need left and/or right neck dissection  Will obtain preoperative imaging and path review prior to committing to surgery  She will follow up here in week after we get update his scans

## 2022-08-22 NOTE — PROGRESS NOTES
Surgical Oncology Consult       [address] [facility] [place]  [address] [place] [address]  [address] [place] [address] SABINE Donohue 89  1940  [id number]  [address] [facility]ARE SURGICAL ONCOLOGY Aparnayari Huang  [address] [facility]  [phone]    Chief Complaint   Patient presents with    New Patient Visit       Assessment/Plan:    No problem-specific Assessment & Plan notes found for this encounter  Diagnoses and all orders for this visit:    Thyroid cancer ([facility] [address] )  -     US thyroid; Future  -     US head neck lymph node mapping; Future  -     Thyroglobulin, Quant w/ AB ([place]); Future  -     CT soft tissue neck w wo contrast; Future  -     Basic metabolic panel; Future    Other orders  -     fluticasone-salmeterol (ADVAIR HFA) 115-21 MCG/ACT inhaler; Inhale 2 puffs 2 (two) times a day Rinse mouth after use  Advance Care Planning/Advance Directives:  Discussed disease status, cancer treatment plans and/or cancer treatment goals with the patient  Oncology History   Thyroid cancer ([facility] [address] )   6/28/2021 Surgery    Slides reviewed by [facility] pathology:  1  Slides (6) labeled [id number] from [facility] (obtained on: 11/17/2016):   A  Thyroid, right, lobectomy:   -  Papillary thyroid carcinoma, tall cell variant, with necrosis (high grade papillary carcinoma),3 2 cm, unencapsulated,    - Background thyroid tissue with adenomatous nodule  B  Thyroid, left lobe, subtotal lobectomy:   - Thyroid tissue with adenomatous nodule  2  Slides (3) labeled  from [facility] (obtained on: 01/21/2020)   A  Lymph node, right neck, biopsy:   -  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; 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        8/8/2022 Biopsy    Neck, Soft Tissue Mass, Left neck Ultrasound-guided Needle Biopsy (3 slides [id number] [facility], collected 7/12/2022):  - Papillary thyroid carcinoma, focally invading fibrous tissue  History of Present Illness:  Patient is a 58-year-old woman with history of thyroid cancer treated with right lobectomy and subtotal left lobectomy  Recently, she is had swelling in the left neck which was thought to be a metastatic lymph node  Biopsy confirmed this  She is been referred for evaluation and treatment she did not receive radioactive iodine at time of surgery which was done in 2016  Review of Systems   Constitutional: Negative  HENT: Negative  Eyes: Negative  Respiratory: Negative  Cardiovascular: Negative  Gastrointestinal: Negative  Endocrine: Negative  Genitourinary: Negative  Musculoskeletal: Negative  Skin: Negative  Allergic/Immunologic: Negative  Neurological: Negative  Hematological: Positive for adenopathy  Psychiatric/Behavioral: Negative  Patient Active Problem List   Diagnosis    Postoperative hypothyroidism    Thyroid cancer ([facility] [address] )     No past medical history on file  Past Surgical History:   Procedure Laterality Date    US GUIDED THYROID BIOPSY  11/7/2016     Family History   Problem Relation Age of Onset    Heart disease Mother     Heart disease Father     Lupus Sister      Social History     Socioeconomic History    Marital status:       Spouse name: Not on file    Number of children: Not on file    Years of education: Not on file    Highest education level: Not on file   Occupational History    Not on file   Tobacco Use    Smoking status: Never Smoker    Smokeless tobacco: Never Used   Substance and Sexual Activity    Alcohol use: Not on file    Drug use: Not on file    Sexual activity: Not on file   Other Topics Concern    Not on file   Social History Narrative    Not on file     Social Determinants of Health     Financial Resource Strain: Not on file   Food Insecurity: Not on file   Transportation Needs: Not on file Physical Activity: Not on file   Stress: Not on file   Social Connections: Not on file   Intimate Partner Violence: Not on file   Housing Stability: Not on file       Current Outpatient Medications:     dorzolamide-timolol (COSOPT) 22 3-6 8 MG/ML ophthalmic solution, INSTILL 1 DROP LEFT EYE 2 TIMES A DAY, Disp: , Rfl:     fluticasone-salmeterol (ADVAIR HFA) 115-21 MCG/ACT inhaler, Inhale 2 puffs 2 (two) times a day Rinse mouth after use , Disp: , Rfl:     levothyroxine 112 mcg tablet, 1 tab daily 6 days of the week, 2 tabs on sundays  , Disp: 102 tablet, Rfl: 3    lisinopril (ZESTRIL) 10 mg tablet, Take 20 mg by mouth daily , Disp: , Rfl: 1    LUMIGAN 0 01 % ophthalmic drops, INSTILL 1 DROP INTO LEFT EYE IN THE EVENING, Disp: , Rfl:     prednisoLONE acetate (PRED FORTE) 1 % ophthalmic suspension, 1 drop 2 (two) times a day, Disp: , Rfl:     simvastatin (ZOCOR) 20 mg tablet, Take 20 mg by mouth daily at bedtime, Disp: , Rfl: 1    alendronate (FOSAMAX) 70 mg tablet, Take 70 mg by mouth once a week (Patient not taking: Reported on 8/22/2022), Disp: , Rfl: 3    SYMBICORT 160-4 5 MCG/ACT inhaler, 2 PUFF INHALATION DAILY (Patient not taking: Reported on 8/22/2022), Disp: , Rfl: 5  Allergies   Allergen Reactions    Amoxicillin GI Intolerance     Vitals:    08/22/22 0849   BP: 128/82   Pulse: 71   Resp: 15   Temp: 98 °F (36 7 °C)   SpO2: 99%       Physical Exam  Constitutional:       Appearance: Normal appearance  HENT:      Head: Normocephalic and atraumatic  Right Ear: External ear normal       Left Ear: External ear normal    Eyes:      Extraocular Movements: Extraocular movements intact  Pupils: Pupils are equal, round, and reactive to light  Cardiovascular:      Rate and Rhythm: Normal rate and regular rhythm  Pulses: Normal pulses  Heart sounds: Normal heart sounds  Pulmonary:      Effort: Pulmonary effort is normal       Breath sounds: Normal breath sounds     Abdominal: General: Abdomen is flat  Palpations: Abdomen is soft  Musculoskeletal:         General: Normal range of motion  Cervical back: Normal range of motion and neck supple  Skin:     General: Skin is warm and dry  Neurological:      General: No focal deficit present  Mental Status: She is alert and oriented to person, place, and time  Psychiatric:         Mood and Affect: Mood normal          Behavior: Behavior normal          Pathology:  Case Report   Surgical Pathology Report                         Case: [id number]                                    Authorizing Provider: Sonu Severino MD        Collected:           08/08/2022 [facility]               Ordering Location:     [facility]      Received:            08/08/2022 [facility]                                      Hospital Specialty                                                                                   Laboratory                                                                    Pathologist:           Mono Samayoa MD                                                          Specimen:    Neck, Soft Tissue Mass, Left neck Ultrasound-guided Biopsy ( 3 slides [id number], collected 7/12/2022)                                                        Final Diagnosis   A  Neck, Soft Tissue Mass, Left neck Ultrasound-guided Needle Biopsy (3 slides [id number] [facility], collected 7/12/2022):  - Papillary thyroid carcinoma, focally invading fibrous tissue        Electronically signed by Mono Samayoa MD on 8/8/2022 at 11:08 AM   Note    I agree with the original pathologist's diagnosis  A copy of the final report is sent to Dr Rahul Serrano on 8/8/22 @ 1105 hours  Labs:  Lab Results   Component Value Date    TSH 0 668 06/11/2020         Imaging  Outside CT scan of neck showing palpable mass and left neck increase in size compared to prior studies  This measures 2 8 x 2 6 x 1 7 cm  There are multiple enlarged lymph nodes seen bilaterally  In the right cervical chain there is a node measuring 6 x 15 mm  In the right cervical chain as a 2nd node measuring 2 8 cm  Anterior to delisa there were 2 lymph nodes that are 14 and 11 mm in size  Thyroid gland surgically absent  I reviewed the above laboratory and imaging data  Discussion/Summary:  Patient status post right thyroidectomy and subtotal left hemithyroidectomy based on outside op report  Now with left adenopathy, biopsy-proven papillary cancer  Right side unaccounted for  Patient may need left and/or right neck dissection  Will obtain preoperative imaging and path review prior to committing to surgery  She will follow up here in week after we get update his scans

## 2022-08-22 NOTE — LETTER
August 22, 2022     [facility]  [address] [facility]    Patient: Weston Regan   YOB: 1940   Date of Visit: 8/22/2022       Dear Dr Leena Mccormack: Thank you for referring Chaya Choe to me for evaluation  Below are my notes for this consultation  If you have questions, please do not hesitate to call me  I look forward to following your patient along with you  Sincerely,        Carey Domingo MD        CC: DO Aj Anaya DO Bonni Alm, MD  8/22/2022  9:48 AM  Sign when Signing Visit               Surgical Oncology Consult       [address] [facility] [address] [address]  [address] [facility],[address]  1940  [id number]  [address] [facility] [address] [id number] [phone]    Chief Complaint   Patient presents with    New Patient Visit       Assessment/Plan:    No problem-specific Assessment & Plan notes found for this encounter  Diagnoses and all orders for this visit:    Thyroid cancer ([facility] [address] )  -     US thyroid; Future  -     US head neck lymph node mapping; Future  -     Thyroglobulin, Quant w/ AB ([place]); Future  -     CT soft tissue neck w wo contrast; Future  -     Basic metabolic panel; Future    Other orders  -     fluticasone-salmeterol (ADVAIR HFA) 115-21 MCG/ACT inhaler; Inhale 2 puffs 2 (two) times a day Rinse mouth after use  Advance Care Planning/Advance Directives:  Discussed disease status, cancer treatment plans and/or cancer treatment goals with the patient  Oncology History   Thyroid cancer ([facility] [address] )   6/28/2021 Surgery    Slides reviewed by [facility] pathology:  1  Slides (6) labeled [id number] from [facility] (obtained on: 11/17/2016):   A   Thyroid, right, lobectomy:   -  Papillary thyroid carcinoma, tall cell variant, with necrosis (high grade papillary carcinoma),3 2 cm, unencapsulated,    - Background thyroid tissue with adenomatous nodule  B  Thyroid, left lobe, subtotal lobectomy:   - Thyroid tissue with adenomatous nodule  2  Slides (3) labeled  from [facility] (obtained on: 01/21/2020)   A  Lymph node, right neck, biopsy:   -  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; 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  8/8/2022 Biopsy    Neck, Soft Tissue Mass, Left neck Ultrasound-guided Needle Biopsy (3 slides [id number] [facility], collected 7/12/2022):  - Papillary thyroid carcinoma, focally invading fibrous tissue  History of Present Illness:  Patient is a 45-year-old woman with history of thyroid cancer treated with right lobectomy and subtotal left lobectomy  Recently, she is had swelling in the left neck which was thought to be a metastatic lymph node  Biopsy confirmed this  She is been referred for evaluation and treatment she did not receive radioactive iodine at time of surgery which was done in 2016  Review of Systems   Constitutional: Negative  HENT: Negative  Eyes: Negative  Respiratory: Negative  Cardiovascular: Negative  Gastrointestinal: Negative  Endocrine: Negative  Genitourinary: Negative  Musculoskeletal: Negative  Skin: Negative  Allergic/Immunologic: Negative  Neurological: Negative  Hematological: Positive for adenopathy  Psychiatric/Behavioral: Negative  Patient Active Problem List   Diagnosis    Postoperative hypothyroidism    Thyroid cancer ([facility] [address] )     No past medical history on file  Past Surgical History:   Procedure Laterality Date    US GUIDED THYROID BIOPSY  11/7/2016     Family History   Problem Relation Age of Onset    Heart disease Mother     Heart disease Father     Lupus Sister      Social History     Socioeconomic History    Marital status:       Spouse name: Not on file    Number of children: Not on file    Years of education: Not on file    Highest education level: Not on file   Occupational History    Not on file   Tobacco Use    Smoking status: Never Smoker    Smokeless tobacco: Never Used   Substance and Sexual Activity    Alcohol use: Not on file    Drug use: Not on file    Sexual activity: Not on file   Other Topics Concern    Not on file   Social History Narrative    Not on file     Social Determinants of Health     Financial Resource Strain: Not on file   Food Insecurity: Not on file   Transportation Needs: Not on file   Physical Activity: Not on file   Stress: Not on file   Social Connections: Not on file   Intimate Partner Violence: Not on file   Housing Stability: Not on file       Current Outpatient Medications:     dorzolamide-timolol (COSOPT) 22 3-6 8 MG/ML ophthalmic solution, INSTILL 1 DROP LEFT EYE 2 TIMES A DAY, Disp: , Rfl:     fluticasone-salmeterol (ADVAIR HFA) 115-21 MCG/ACT inhaler, Inhale 2 puffs 2 (two) times a day Rinse mouth after use , Disp: , Rfl:     levothyroxine 112 mcg tablet, 1 tab daily 6 days of the week, 2 tabs on sundays  , Disp: 102 tablet, Rfl: 3    lisinopril (ZESTRIL) 10 mg tablet, Take 20 mg by mouth daily , Disp: , Rfl: 1    LUMIGAN 0 01 % ophthalmic drops, INSTILL 1 DROP INTO LEFT EYE IN THE EVENING, Disp: , Rfl:     prednisoLONE acetate (PRED FORTE) 1 % ophthalmic suspension, 1 drop 2 (two) times a day, Disp: , Rfl:     simvastatin (ZOCOR) 20 mg tablet, Take 20 mg by mouth daily at bedtime, Disp: , Rfl: 1    alendronate (FOSAMAX) 70 mg tablet, Take 70 mg by mouth once a week (Patient not taking: Reported on 8/22/2022), Disp: , Rfl: 3    SYMBICORT 160-4 5 MCG/ACT inhaler, 2 PUFF INHALATION DAILY (Patient not taking: Reported on 8/22/2022), Disp: , Rfl: 5  Allergies   Allergen Reactions    Amoxicillin GI Intolerance     Vitals:    08/22/22 0849   BP: 128/82   Pulse: 71   Resp: 15   Temp: 98 °F (36 7 °C)   SpO2: 99%       Physical Exam  Constitutional:       Appearance: Normal appearance  HENT:      Head: Normocephalic and atraumatic  Right Ear: External ear normal       Left Ear: External ear normal    Eyes:      Extraocular Movements: Extraocular movements intact  Pupils: Pupils are equal, round, and reactive to light  Cardiovascular:      Rate and Rhythm: Normal rate and regular rhythm  Pulses: Normal pulses  Heart sounds: Normal heart sounds  Pulmonary:      Effort: Pulmonary effort is normal       Breath sounds: Normal breath sounds  Abdominal:      General: Abdomen is flat  Palpations: Abdomen is soft  Musculoskeletal:         General: Normal range of motion  Cervical back: Normal range of motion and neck supple  Skin:     General: Skin is warm and dry  Neurological:      General: No focal deficit present  Mental Status: She is alert and oriented to person, place, and time  Psychiatric:         Mood and Affect: Mood normal          Behavior: Behavior normal          Pathology:  Case Report   Surgical Pathology Report                         Case: [id number]                                    Authorizing Provider: Gardenia Trinh MD        Collected:           08/08/2022 0748               Ordering Location:     [facility]      Received:            08/08/2022 [facility]                                      Hospital Specialty                                                                                   Laboratory                                                                    Pathologist:           Deidre Aceves MD                                                          Specimen:    Neck, Soft Tissue Mass, Left neck Ultrasound-guided Biopsy ( 3 slides [id number], collected 7/12/2022)                                                        Final Diagnosis   A   Neck, Soft Tissue Mass, Left neck Ultrasound-guided Needle Biopsy (3 slides [address], collected 7/12/2022):  - Papillary thyroid carcinoma, focally invading fibrous tissue        Electronically signed by Miguel Leal MD on 8/8/2022 at 11:08 AM   Note    I agree with the original pathologist's diagnosis  A copy of the final report is sent to Dr Ana Smith on 8/8/22 @ 1105 hours  Labs:  Lab Results   Component Value Date    TSH 0 668 06/11/2020         Imaging  Outside CT scan of neck showing palpable mass and left neck increase in size compared to prior studies  This measures 2 8 x 2 6 x 1 7 cm  There are multiple enlarged lymph nodes seen bilaterally  In the right cervical chain there is a node measuring 6 x 15 mm  In the right cervical chain as a 2nd node measuring 2 8 cm  Anterior to delisa there were 2 lymph nodes that are 14 and 11 mm in size  Thyroid gland surgically absent  I reviewed the above laboratory and imaging data  Discussion/Summary:  Patient status post right thyroidectomy and subtotal left hemithyroidectomy based on outside op report  Now with left adenopathy, biopsy-proven papillary cancer  Right side unaccounted for  Patient may need left and/or right neck dissection  Will obtain preoperative imaging and path review prior to committing to surgery  She will follow up here in week after we get update his scans

## 2022-08-22 NOTE — TELEPHONE ENCOUNTER
Received note from Dr Jo Gottron  Looks like pt needs a follow up appt if she is still going to follow up with us

## 2022-08-25 ENCOUNTER — TELEPHONE (OUTPATIENT)
Dept: SURGICAL ONCOLOGY | Facility: CLINIC | Age: 82
End: 2022-08-25

## 2022-08-25 ENCOUNTER — DOCUMENTATION (OUTPATIENT)
Dept: HEMATOLOGY ONCOLOGY | Facility: CLINIC | Age: 82
End: 2022-08-25

## 2022-08-25 NOTE — TELEPHONE ENCOUNTER
Spoke to patient and reviewed all the imaging and biopsy appts and lab work  All will be done at [facility]  Patient verbalized understanding and thanks  Email also sent to our financial group as patient was very concerned about the cost of everything  Our financial team reached out to her and she states that she is feeling a little better about it all

## 2022-08-25 NOTE — PROGRESS NOTES
recvd email from Diamond that pt has some questions about her bills & she would like a call back  Called pt & she sd  That she is on a fixed income & she has 2 sons that live w/her  She has requested the c/c application form [facility] & she sd that once she gets that she will then fill  It out & send it back   Told her that I will reach back out to her to see when she submits the applicaiton

## 2022-08-29 DIAGNOSIS — C73 THYROID CANCER (HCC): Primary | ICD-10-CM

## 2022-08-30 ENCOUNTER — APPOINTMENT (OUTPATIENT)
Dept: LAB | Facility: HOSPITAL | Age: 82
End: 2022-08-30
Attending: SURGERY
Payer: COMMERCIAL

## 2022-08-30 ENCOUNTER — APPOINTMENT (OUTPATIENT)
Dept: ULTRASOUND IMAGING | Facility: HOSPITAL | Age: 82
End: 2022-08-30
Attending: SURGERY
Payer: COMMERCIAL

## 2022-08-30 ENCOUNTER — HOSPITAL ENCOUNTER (OUTPATIENT)
Dept: ULTRASOUND IMAGING | Facility: HOSPITAL | Age: 82
Discharge: HOME | End: 2022-08-30
Attending: SURGERY
Payer: COMMERCIAL

## 2022-08-30 DIAGNOSIS — C73 THYROID CANCER (HCC): ICD-10-CM

## 2022-08-30 LAB
ANION GAP SERPL CALCULATED.3IONS-SCNC: 9 MMOL/L (ref 4–13)
BUN SERPL-MCNC: 15 MG/DL (ref 5–25)
CALCIUM SERPL-MCNC: 9.1 MG/DL (ref 8.3–10.1)
CHLORIDE SERPL-SCNC: 105 MMOL/L (ref 96–108)
CO2 SERPL-SCNC: 28 MMOL/L (ref 21–32)
CREAT SERPL-MCNC: 0.69 MG/DL (ref 0.6–1.3)
GFR SERPL CREATININE-BSD FRML MDRD: 81 ML/MIN/1.73SQ M
GLUCOSE SERPL-MCNC: 85 MG/DL (ref 65–140)
POTASSIUM SERPL-SCNC: 3.8 MMOL/L (ref 3.5–5.3)
SODIUM SERPL-SCNC: 142 MMOL/L (ref 135–147)

## 2022-08-30 PROCEDURE — 80048 BASIC METABOLIC PNL TOTAL CA: CPT

## 2022-08-30 PROCEDURE — 84432 ASSAY OF THYROGLOBULIN: CPT

## 2022-08-30 PROCEDURE — 36415 COLL VENOUS BLD VENIPUNCTURE: CPT

## 2022-08-30 PROCEDURE — 76536 US EXAM OF HEAD AND NECK: CPT

## 2022-08-30 PROCEDURE — 86800 THYROGLOBULIN ANTIBODY: CPT

## 2022-08-31 LAB
THYROGLOB AB SERPL-ACNC: <1 IU/ML (ref 0–0.9)
THYROGLOB SERPL-MCNC: 1.4 NG/ML (ref 1.5–38.5)

## 2022-09-03 ENCOUNTER — HOSPITAL ENCOUNTER (OUTPATIENT)
Dept: CT IMAGING | Facility: HOSPITAL | Age: 82
Discharge: HOME | End: 2022-09-03
Attending: SURGERY
Payer: COMMERCIAL

## 2022-09-03 DIAGNOSIS — C73 THYROID CANCER (HCC): ICD-10-CM

## 2022-09-03 PROCEDURE — 70491 CT SOFT TISSUE NECK W/DYE: CPT

## 2022-09-03 PROCEDURE — G1004 CDSM NDSC: HCPCS

## 2022-09-03 RX ADMIN — IOHEXOL 70 ML: 350 INJECTION, SOLUTION INTRAVENOUS at 11:15

## 2022-09-06 ENCOUNTER — HOSPITAL ENCOUNTER (OUTPATIENT)
Dept: ULTRASOUND IMAGING | Facility: HOSPITAL | Age: 82
Discharge: HOME | End: 2022-09-06
Attending: SURGERY
Payer: COMMERCIAL

## 2022-09-06 DIAGNOSIS — C73 THYROID CANCER (HCC): ICD-10-CM

## 2022-09-06 PROCEDURE — 88173 CYTOPATH EVAL FNA REPORT: CPT | Performed by: PATHOLOGY

## 2022-09-06 PROCEDURE — 84432 ASSAY OF THYROGLOBULIN: CPT | Performed by: SURGERY

## 2022-09-06 PROCEDURE — 88172 CYTP DX EVAL FNA 1ST EA SITE: CPT | Performed by: PATHOLOGY

## 2022-09-06 PROCEDURE — 10009 FNA BX W/CT GDN 1ST LES: CPT

## 2022-09-06 PROCEDURE — 86800 THYROGLOBULIN ANTIBODY: CPT | Performed by: SURGERY

## 2022-09-06 RX ORDER — LIDOCAINE HYDROCHLORIDE 10 MG/ML
5 INJECTION, SOLUTION EPIDURAL; INFILTRATION; INTRACAUDAL; PERINEURAL ONCE
Status: COMPLETED | OUTPATIENT
Start: 2022-09-06 | End: 2022-09-06

## 2022-09-06 RX ADMIN — LIDOCAINE HYDROCHLORIDE 2.5 ML: 10 INJECTION, SOLUTION EPIDURAL; INFILTRATION; INTRACAUDAL; PERINEURAL at 10:15

## 2022-09-07 LAB — SCAN RESULT: NORMAL

## 2022-09-08 LAB — SCAN RESULT: NORMAL

## 2022-09-08 PROCEDURE — 88172 CYTP DX EVAL FNA 1ST EA SITE: CPT | Performed by: PATHOLOGY

## 2022-09-08 PROCEDURE — 88173 CYTOPATH EVAL FNA REPORT: CPT | Performed by: PATHOLOGY

## 2022-09-13 ENCOUNTER — OFFICE VISIT (OUTPATIENT)
Dept: SURGICAL ONCOLOGY | Facility: CLINIC | Age: 82
End: 2022-09-13
Payer: COMMERCIAL

## 2022-09-13 VITALS
WEIGHT: 165 LBS | DIASTOLIC BLOOD PRESSURE: 78 MMHG | RESPIRATION RATE: 16 BRPM | HEART RATE: 61 BPM | OXYGEN SATURATION: 99 % | HEIGHT: 57 IN | BODY MASS INDEX: 35.6 KG/M2 | SYSTOLIC BLOOD PRESSURE: 128 MMHG | TEMPERATURE: 96.8 F

## 2022-09-13 DIAGNOSIS — C73 THYROID CANCER (HCC): Primary | ICD-10-CM

## 2022-09-13 PROCEDURE — 99214 OFFICE O/P EST MOD 30 MIN: CPT | Performed by: SURGERY

## 2022-09-13 RX ORDER — TRAMADOL HYDROCHLORIDE 50 MG/1
50 TABLET ORAL EVERY 6 HOURS PRN
Qty: 10 TABLET | Refills: 0 | Status: SHIPPED | OUTPATIENT
Start: 2022-09-13

## 2022-09-13 NOTE — LETTER
September 13, 2022     DO Manish Azulyhamaryana [address] [facility]    Patient: Katherine Serrano   YOB: 1940   Date of Visit: 9/13/2022       Dear Dr Barrett Davidson: Thank you for referring Nerissa Fox to me for evaluation  Below are my notes for this consultation  If you have questions, please do not hesitate to call me  I look forward to following your patient along with you  Sincerely,        Clare Moe MD        CC: DO Clare Najera MD  9/13/2022  2:23 PM  Sign when Signing Visit     Surgical Oncology Follow Up       [address] [facility] [address] [address]  [address] [place] [address],[address]  1940  [id number]  [address] [place] [address] [facility] SUR[facility] Trisha Hansen  [address] [place] [id number] [phone]    Chief Complaint   Patient presents with    Follow-up       Assessment/Plan:    No problem-specific Assessment & Plan notes found for this encounter  Diagnoses and all orders for this visit:    Thyroid cancer [facility])        Advance Care Planning/Advance Directives:  Discussed disease status, cancer treatment plans and/or cancer treatment goals with the patient  Oncology History   Thyroid cancer ([facility] [address] )   6/28/2021 Surgery    Slides reviewed by [facility] pathology:  1  Slides (6) labeled [id number] from [facility] (obtained on: 11/17/2016):   A  Thyroid, right, lobectomy:   -  Papillary thyroid carcinoma, tall cell variant, with necrosis (high grade papillary carcinoma),3 2 cm, unencapsulated,    - Background thyroid tissue with adenomatous nodule  B  Thyroid, left lobe, subtotal lobectomy:   - Thyroid tissue with adenomatous nodule  2  Slides (3) labeled  from [facility] (obtained on: 01/21/2020)   A   Lymph node, right neck, biopsy:   -  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; 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  8/8/2022 Biopsy    Neck, Soft Tissue Mass, Left neck Ultrasound-guided Needle Biopsy (3 slides [id number] [facility], collected 7/12/2022):  - Papillary thyroid carcinoma, focally invading fibrous tissue  8/26/2022 Surgery    Thyroid, Thyroid Right Lobectomy Subtotal Left Lobectomy Thyroid Tissue ( 6 slides [facility], collected 11/17/16):  A) Right thyroid lobectomy and sub total left lobectomy (6 slides):  - Papillary thyroid carcinoma, tall cell variant, with necrosis (high grade papillary carcinoma),3 2 cm, unencapsulated  - Surgical margins appear negative for carcinoma    - Background nodular adenomatous hyperplasia  B  Thyroid, :   - Nodular adenomatous hyperplasia of thyroid  9/6/2022 Biopsy    Lymph Node, Right, Zone 5A (ThinPrep and smear preparations):  Conclusive Evidence of Malignancy  Metastatic carcinoma  , consistent with Metastasis from the patient know papillary thyroid carcinoma          History of Present Illness:  Patient is an 60-year-old woman here for follow-up status post right cervical biopsy  She has a known history of left cervical node metastasis from thyroid cancer   -Interval History:  No new complaints since her last visit  Review of Systems:  Review of Systems   Constitutional: Negative  HENT: Negative  Eyes: Negative  Respiratory: Negative  Cardiovascular: Negative  Gastrointestinal: Negative  Endocrine: Negative  Genitourinary: Negative  Musculoskeletal: Negative  Skin: Negative  Allergic/Immunologic: Negative  Neurological: Negative  Hematological: Negative  Psychiatric/Behavioral: Negative  All other systems reviewed and are negative  Patient Active Problem List   Diagnosis    Postoperative hypothyroidism    Thyroid cancer ([address] )     No past medical history on file    Past Surgical History:   Procedure Laterality Date    US GUIDED LYMPH NODE BIOPSY LEFT  9/6/2022    US GUIDED THYROID BIOPSY  11/7/2016     Family History   Problem Relation Age of Onset    Heart disease Mother     Heart disease Father     Lupus Sister      Social History     Socioeconomic History    Marital status:      Spouse name: Not on file    Number of children: Not on file    Years of education: Not on file    Highest education level: Not on file   Occupational History    Not on file   Tobacco Use    Smoking status: Never Smoker    Smokeless tobacco: Never Used   Substance and Sexual Activity    Alcohol use: Not on file    Drug use: Not on file    Sexual activity: Not on file   Other Topics Concern    Not on file   Social History Narrative    Not on file     Social Determinants of Health     Financial Resource Strain: Not on file   Food Insecurity: Not on file   Transportation Needs: Not on file   Physical Activity: Not on file   Stress: Not on file   Social Connections: Not on file   Intimate Partner Violence: Not on file   Housing Stability: Not on file       Current Outpatient Medications:     dorzolamide-timolol (COSOPT) 22 3-6 8 MG/ML ophthalmic solution, INSTILL 1 DROP LEFT EYE 2 TIMES A DAY, Disp: , Rfl:     fluticasone-salmeterol (ADVAIR HFA) 115-21 MCG/ACT inhaler, Inhale 2 puffs 2 (two) times a day Rinse mouth after use , Disp: , Rfl:     levothyroxine 112 mcg tablet, 1 tab daily 6 days of the week, 2 tabs on sundays  , Disp: 102 tablet, Rfl: 3    lisinopril (ZESTRIL) 10 mg tablet, Take 20 mg by mouth daily , Disp: , Rfl: 1    LUMIGAN 0 01 % ophthalmic drops, INSTILL 1 DROP INTO LEFT EYE IN THE EVENING, Disp: , Rfl:     prednisoLONE acetate (PRED FORTE) 1 % ophthalmic suspension, 1 drop 2 (two) times a day, Disp: , Rfl:     simvastatin (ZOCOR) 20 mg tablet, Take 20 mg by mouth daily at bedtime, Disp: , Rfl: 1    alendronate (FOSAMAX) 70 mg tablet, Take 70 mg by mouth once a week (Patient not taking: No sig reported), Disp: , Rfl: 3   SYMBICORT 160-4 5 MCG/ACT inhaler, 2 PUFF INHALATION DAILY (Patient not taking: No sig reported), Disp: , Rfl: 5  Allergies   Allergen Reactions    Amoxicillin GI Intolerance     Vitals:    09/13/22 1341   BP: 128/78   Pulse: 61   Resp: 16   Temp: (!) 96 8 °F (36 °C)   SpO2: 99%       Physical Exam  Constitutional:       Appearance: Normal appearance  HENT:      Head: Normocephalic and atraumatic  Right Ear: External ear normal       Left Ear: External ear normal       Mouth/Throat:      Mouth: Mucous membranes are dry  Eyes:      Extraocular Movements: Extraocular movements intact  Pupils: Pupils are equal, round, and reactive to light  Neck:      Comments: Palpable bilateral cervical adenopathy  Right level 5, and left level 4 nodes noted  Cardiovascular:      Rate and Rhythm: Normal rate and regular rhythm  Pulses: Normal pulses  Heart sounds: Normal heart sounds  Pulmonary:      Breath sounds: Normal breath sounds  Abdominal:      General: Abdomen is flat  Musculoskeletal:         General: Normal range of motion  Cervical back: Normal range of motion and neck supple  No rigidity or tenderness  Lymphadenopathy:      Cervical: No cervical adenopathy  Skin:     General: Skin is warm and dry  Neurological:      General: No focal deficit present  Mental Status: She is alert and oriented to person, place, and time  Psychiatric:         Mood and Affect: Mood normal          Behavior: Behavior normal            Results:  Labs:  FNA thyroglobulin level in right level 5 lymph node showing thyroglobulin of 1336 ng/mL  Histology confirming malignant papillary thyroid cancer level 5A lymph node, dated September 6, 2022  Imaging  CT soft tissue neck w contrast    Result Date: 9/7/2022  Narrative: CT NECK WITH CONTRAST INDICATION:   C73: Malignant neoplasm of thyroid gland  COMPARISON:  Outside noncontrast CT Sandhya 15, 2022   TECHNIQUE:  Axial, sagittal, and coronal 2D reformatted images were created from the axial source data and submitted for interpretation  Radiation dose length product (DLP) for this visit:  543 32 mGy-cm   This examination, like all CT scans performed in the [facility], was performed utilizing techniques to minimize radiation dose exposure, including the use of iterative  reconstruction and automated exposure control  IV Contrast:  70 mL of iohexol (OMNIPAQUE) IMAGE QUALITY:  Diagnostic  FINDINGS: VISUALIZED BRAIN PARENCHYMA:  No acute intracranial pathology of the visualized brain parenchyma  VISUALIZED ORBITS AND PARANASAL SINUSES:  Bilateral cataract surgery  NASAL CAVITY AND NASOPHARYNX:  Normal  SUPRAHYOID NECK:  Normal oral cavity, tongue base, tonsillar fossa and epiglottis  INFRAHYOID NECK:  Aryepiglottic folds and piriform sinuses are normal   Normal glottis and subglottic airway  THYROID GLAND:  Patient is status post thyroidectomy  PAROTID AND SUBMANDIBULAR GLANDS:  Normal  LYMPH NODES:  Trudi mass right posterior triangle series 2 image 53 measures 1 7 x 1 3 x 2 8 cm  There are additional smaller posterior triangle nodes identified  There is a left supraclavicular mass identified measuring 2 8 x 3 0 x 1 8 cm  VASCULAR STRUCTURES:  Normal enhancement of the cervical vasculature  THORACIC INLET:  Lung apices and upper mediastinum are unremarkable  BONY STRUCTURES: No acute fracture or destructive osseous lesion  Impression: Right posterior triangle trudi mass and left supraclavicular trudi mass noted similar in size to the prior noncontrast CT  Additional smaller lymph nodes are also identified in the inferior aspect of the posterior triangle bilaterally  Patient status post thyroidectomy  No obvious recurrent tumor identified within the thyroidectomy bed  Workstation performed: [id number]     US head neck lymph node mapping    Result Date: 8/30/2022  Narrative: NECK ULTRASOUND INDICATION:     C73:  Malignant neoplasm of thyroid gland  COMPARISON:  Multiple priors most recently ultrasound-guided biopsy from outside institution 7/12/2022 FINDINGS: Ultrasound of the thyroidectomy bed and cervical lymph node chains was performed with a high frequency linear transducer  There is no suspicion of recurrent mass in the thyroidectomy bed  Residual left lobe thyroid gland measuring 0 8 x 1 7 x 0 6 cm, without suspicious nodule or mass  Right level 5A enlarged hypoechoic lymph node measuring 2 7 x 1 3 x 1 8 cm, similar in size and upper differences in technique compared to ultrasound of 5/20/2021, at which time it was measured at 2 3 x 1 1 x 2 1 cm  Left supra clavicular mass previously biopsied measuring 2 9 x 1 8 x 2 2 cm, increased in size from ultrasound of 5/20/2021, but measuring similar size to the ultrasound-guided biopsy of 7/12/2022  No evidence for microcalcification or focal nodularity  Impression: Enlarged right level 5A lymph node, as described  Previously biopsied left supra clavicular mass similar in size to ultrasound-guided biopsy of 7/12/2022  Workstation performed: [id number]     US guided lymph node biopsy right with thyroglobulin blood test    Result Date: 9/6/2022  Narrative: ULTRASOUND-GUIDED NECK LYMPH NODE BIOPSY HISTORY: 81year-old female with a history of metastatic thyroid cancer status post right thyroidectomy and left subtotal thyroidectomy in 2016  Recent left supraclavicular lymph node biopsy on 7/12/2022 at [facility] indicating papillary thyroid carcinoma  Recent evidence of right cervical neck lymphadenopathy  Evaluate for metastatic spread  COMPARISON: Head and neck lymph node mapping ultrasound on 8/30/2022  CT of the soft tissue neck with contrast on 9/3/2022  FINDINGS: On prior head and neck lymph node mapping from 8/30/2022, there was a right level 5A abnormal appearing lymph node measuring 2 7 x 1 3 x 1 8 cm    The patient presents today with a prescription to undergo FNA of the right level 5A lymph node with thyroglobulin washing and thyroglobulin serum analysis  On today's limited ultrasound, the right level 5A lymph node measured 2 8 x 1 4 x 1 5 cm  The procedure was explained to the patient, including risks of hemorrhage, infection and local injury  Informed consent was freely obtained  The patient verbalized expressed understanding of the above risks and wished to proceed with the procedure  Final standard "time-out" procedure performed  PROCEDURE: The neck was prepped and draped in normal sterile fashion  Under real-time ultrasound guidance and local anesthesia 6 passes with a 25-gauge needle were made through the right level 5A lymph node  Cytopathology was present and deemed the specimens adequate for evaluation  3 specimens were sent to cytology, 3 specimens were sent for thyroglobulin washing, and serum thyroglobulin was obtained and sent to lab for requested analysis  The patient tolerated the procedure well  Postprocedure instructions were provided for the patient  I asked the patient to call us with any questions, concerns, or acute problems  The patient expressed understanding of the above  Impression: Status post successful ultrasound-guided biopsy of the right level 5A lymph node  Specimens were sent for cytology, thyroglobulin washing, and serum thyroglobulin was obtained and sent to the lab for requested analysis  I reviewed the above findings and procedure with Dr Gabo Harrison  PERFORMED, DICTATED AND SIGNED BY: Mounika Eugene PA-C Workstation performed: [id number]     I reviewed the above laboratory and imaging data  Discussion/Summary:  81year-old woman, history of thyroid cancer now with metastatic disease in both right and left neck node basins  Plan on bilateral neck dissection  Rationale for this along risks and benefits of surgery including infection, bleeding, spinal accessory nerve injury, discussed    All questions answered and consents signed at this visit   Given prior history of thyroid operation, plan for flexible indirect laryngoscopy as well

## 2022-09-13 NOTE — PROGRESS NOTES
Surgical Oncology Follow Up       [address] [facility] [place]  [address] [facility]  Chuck Gross [address]  [address] [facility] SABINE Donohue 89  1940  [id number]  [address] [facility] [place]  [address] [facility] [place]  [address] [facility] [address] [phone]    Chief Complaint   Patient presents with    Follow-up       Assessment/Plan:    No problem-specific Assessment & Plan notes found for this encounter  Diagnoses and all orders for this visit:    Thyroid cancer [facility])        Advance Care Planning/Advance Directives:  Discussed disease status, cancer treatment plans and/or cancer treatment goals with the patient  Oncology History   Thyroid cancer ([address] )   6/28/2021 Surgery    Slides reviewed by [facility] pathology:  1  Slides (6) labeled [id number] from [facility] (obtained on: 11/17/2016):   A  Thyroid, right, lobectomy:   -  Papillary thyroid carcinoma, tall cell variant, with necrosis (high grade papillary carcinoma),3 2 cm, unencapsulated,    - Background thyroid tissue with adenomatous nodule  B  Thyroid, left lobe, subtotal lobectomy:   - Thyroid tissue with adenomatous nodule  2  Slides (3) labeled  from [facility] (obtained on: 01/21/2020)   A  Lymph node, right neck, biopsy:   -  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; 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  8/8/2022 Biopsy    Neck, Soft Tissue Mass, Left neck Ultrasound-guided Needle Biopsy (3 slides [id number] [facility], collected 7/12/2022):  - Papillary thyroid carcinoma, focally invading fibrous tissue           8/26/2022 Surgery    Thyroid, Thyroid Right Lobectomy Subtotal Left Lobectomy Thyroid Tissue ( 6 slides [facility], collected 11/17/16):  A) Right thyroid lobectomy and sub total left lobectomy (6 slides):  - Papillary thyroid carcinoma, tall cell variant, with necrosis (high grade papillary carcinoma),3 2 cm, unencapsulated  - Surgical margins appear negative for carcinoma    - Background nodular adenomatous hyperplasia  B  Thyroid, :   - Nodular adenomatous hyperplasia of thyroid  9/6/2022 Biopsy    Lymph Node, Right, Zone 5A (ThinPrep and smear preparations):  Conclusive Evidence of Malignancy  Metastatic carcinoma  , consistent with Metastasis from the patient know papillary thyroid carcinoma          History of Present Illness:  Patient is an 81-year-old woman here for follow-up status post right cervical biopsy  She has a known history of left cervical node metastasis from thyroid cancer   -Interval History:  No new complaints since her last visit  Review of Systems:  Review of Systems   Constitutional: Negative  HENT: Negative  Eyes: Negative  Respiratory: Negative  Cardiovascular: Negative  Gastrointestinal: Negative  Endocrine: Negative  Genitourinary: Negative  Musculoskeletal: Negative  Skin: Negative  Allergic/Immunologic: Negative  Neurological: Negative  Hematological: Negative  Psychiatric/Behavioral: Negative  All other systems reviewed and are negative  Patient Active Problem List   Diagnosis    Postoperative hypothyroidism    Thyroid cancer ([facility] [address] )     No past medical history on file  Past Surgical History:   Procedure Laterality Date    US GUIDED LYMPH NODE BIOPSY LEFT  9/6/2022    US GUIDED THYROID BIOPSY  11/7/2016     Family History   Problem Relation Age of Onset    Heart disease Mother     Heart disease Father     Lupus Sister      Social History     Socioeconomic History    Marital status:       Spouse name: Not on file    Number of children: Not on file    Years of education: Not on file    Highest education level: Not on file   Occupational History    Not on file   Tobacco Use    Smoking status: Never Smoker    Smokeless tobacco: Never Used   Substance and Sexual Activity    Alcohol use: Not on file    Drug use: Not on file    Sexual activity: Not on file   Other Topics Concern    Not on file   Social History Narrative    Not on file     Social Determinants of Health     Financial Resource Strain: Not on file   Food Insecurity: Not on file   Transportation Needs: Not on file   Physical Activity: Not on file   Stress: Not on file   Social Connections: Not on file   Intimate Partner Violence: Not on file   Housing Stability: Not on file       Current Outpatient Medications:     dorzolamide-timolol (COSOPT) 22 3-6 8 MG/ML ophthalmic solution, INSTILL 1 DROP LEFT EYE 2 TIMES A DAY, Disp: , Rfl:     fluticasone-salmeterol (ADVAIR HFA) 115-21 MCG/ACT inhaler, Inhale 2 puffs 2 (two) times a day Rinse mouth after use , Disp: , Rfl:     levothyroxine 112 mcg tablet, 1 tab daily 6 days of the week, 2 tabs on sundays  , Disp: 102 tablet, Rfl: 3    lisinopril (ZESTRIL) 10 mg tablet, Take 20 mg by mouth daily , Disp: , Rfl: 1    LUMIGAN 0 01 % ophthalmic drops, INSTILL 1 DROP INTO LEFT EYE IN THE EVENING, Disp: , Rfl:     prednisoLONE acetate (PRED FORTE) 1 % ophthalmic suspension, 1 drop 2 (two) times a day, Disp: , Rfl:     simvastatin (ZOCOR) 20 mg tablet, Take 20 mg by mouth daily at bedtime, Disp: , Rfl: 1    alendronate (FOSAMAX) 70 mg tablet, Take 70 mg by mouth once a week (Patient not taking: No sig reported), Disp: , Rfl: 3    SYMBICORT 160-4 5 MCG/ACT inhaler, 2 PUFF INHALATION DAILY (Patient not taking: No sig reported), Disp: , Rfl: 5  Allergies   Allergen Reactions    Amoxicillin GI Intolerance     Vitals:    09/13/22 1341   BP: 128/78   Pulse: 61   Resp: 16   Temp: (!) 96 8 °F (36 °C)   SpO2: 99%       Physical Exam  Constitutional:       Appearance: Normal appearance  HENT:      Head: Normocephalic and atraumatic  Right Ear: External ear normal       Left Ear: External ear normal       Mouth/Throat:      Mouth: Mucous membranes are dry     Eyes: Extraocular Movements: Extraocular movements intact  Pupils: Pupils are equal, round, and reactive to light  Neck:      Comments: Palpable bilateral cervical adenopathy  Right level 5, and left level 4 nodes noted  Cardiovascular:      Rate and Rhythm: Normal rate and regular rhythm  Pulses: Normal pulses  Heart sounds: Normal heart sounds  Pulmonary:      Breath sounds: Normal breath sounds  Abdominal:      General: Abdomen is flat  Musculoskeletal:         General: Normal range of motion  Cervical back: Normal range of motion and neck supple  No rigidity or tenderness  Lymphadenopathy:      Cervical: No cervical adenopathy  Skin:     General: Skin is warm and dry  Neurological:      General: No focal deficit present  Mental Status: She is alert and oriented to person, place, and time  Psychiatric:         Mood and Affect: Mood normal          Behavior: Behavior normal            Results:  Labs:  FNA thyroglobulin level in right level 5 lymph node showing thyroglobulin of 1336 ng/mL  Histology confirming malignant papillary thyroid cancer level 5A lymph node, dated September 6, 2022  Imaging  CT soft tissue neck w contrast    Result Date: 9/7/2022  Narrative: CT NECK WITH CONTRAST INDICATION:   C73: Malignant neoplasm of thyroid gland  COMPARISON:  Outside noncontrast CT Sandhya 15, 2022  TECHNIQUE:  Axial, sagittal, and coronal 2D reformatted images were created from the axial source data and submitted for interpretation  Radiation dose length product (DLP) for this visit:  543 32 mGy-cm   This examination, like all CT scans performed in the [facility], was performed utilizing techniques to minimize radiation dose exposure, including the use of iterative  reconstruction and automated exposure control  IV Contrast:  70 mL of iohexol (OMNIPAQUE) IMAGE QUALITY:  Diagnostic   FINDINGS: VISUALIZED BRAIN PARENCHYMA:  No acute intracranial pathology of the visualized brain parenchyma  VISUALIZED ORBITS AND PARANASAL SINUSES:  Bilateral cataract surgery  NASAL CAVITY AND NASOPHARYNX:  Normal  SUPRAHYOID NECK:  Normal oral cavity, tongue base, tonsillar fossa and epiglottis  INFRAHYOID NECK:  Aryepiglottic folds and piriform sinuses are normal   Normal glottis and subglottic airway  THYROID GLAND:  Patient is status post thyroidectomy  PAROTID AND SUBMANDIBULAR GLANDS:  Normal  LYMPH NODES:  Trudi mass right posterior triangle series 2 image 53 measures 1 7 x 1 3 x 2 8 cm  There are additional smaller posterior triangle nodes identified  There is a left supraclavicular mass identified measuring 2 8 x 3 0 x 1 8 cm  VASCULAR STRUCTURES:  Normal enhancement of the cervical vasculature  THORACIC INLET:  Lung apices and upper mediastinum are unremarkable  BONY STRUCTURES: No acute fracture or destructive osseous lesion  Impression: Right posterior triangle trudi mass and left supraclavicular trudi mass noted similar in size to the prior noncontrast CT  Additional smaller lymph nodes are also identified in the inferior aspect of the posterior triangle bilaterally  Patient status post thyroidectomy  No obvious recurrent tumor identified within the thyroidectomy bed  Workstation performed: [id number]     US head neck lymph node mapping    Result Date: 8/30/2022  Narrative: NECK ULTRASOUND INDICATION:     C73: Malignant neoplasm of thyroid gland  COMPARISON:  Multiple priors most recently ultrasound-guided biopsy from outside institution 7/12/2022 FINDINGS: Ultrasound of the thyroidectomy bed and cervical lymph node chains was performed with a high frequency linear transducer  There is no suspicion of recurrent mass in the thyroidectomy bed  Residual left lobe thyroid gland measuring 0 8 x 1 7 x 0 6 cm, without suspicious nodule or mass   Right level 5A enlarged hypoechoic lymph node measuring 2 7 x 1 3 x 1 8 cm, similar in size and upper differences in technique compared to ultrasound of 5/20/2021, at which time it was measured at 2 3 x 1 1 x 2 1 cm  Left supra clavicular mass previously biopsied measuring 2 9 x 1 8 x 2 2 cm, increased in size from ultrasound of 5/20/2021, but measuring similar size to the ultrasound-guided biopsy of 7/12/2022  No evidence for microcalcification or focal nodularity  Impression: Enlarged right level 5A lymph node, as described  Previously biopsied left supra clavicular mass similar in size to ultrasound-guided biopsy of 7/12/2022  Workstation performed: [id number]     US guided lymph node biopsy right with thyroglobulin blood test    Result Date: 9/6/2022  Narrative: ULTRASOUND-GUIDED NECK LYMPH NODE BIOPSY HISTORY: 81year-old female with a history of metastatic thyroid cancer status post right thyroidectomy and left subtotal thyroidectomy in 2016  Recent left supraclavicular lymph node biopsy on 7/12/2022 at [facility] indicating papillary thyroid carcinoma  Recent evidence of right cervical neck lymphadenopathy  Evaluate for metastatic spread  COMPARISON: Head and neck lymph node mapping ultrasound on 8/30/2022  CT of the soft tissue neck with contrast on 9/3/2022  FINDINGS: On prior head and neck lymph node mapping from 8/30/2022, there was a right level 5A abnormal appearing lymph node measuring 2 7 x 1 3 x 1 8 cm  The patient presents today with a prescription to undergo FNA of the right level 5A lymph node with thyroglobulin washing and thyroglobulin serum analysis  On today's limited ultrasound, the right level 5A lymph node measured 2 8 x 1 4 x 1 5 cm  The procedure was explained to the patient, including risks of hemorrhage, infection and local injury  Informed consent was freely obtained  The patient verbalized expressed understanding of the above risks and wished to proceed with the procedure  Final standard "time-out" procedure performed  PROCEDURE: The neck was prepped and draped in normal sterile fashion  Under real-time ultrasound guidance and local anesthesia 6 passes with a 25-gauge needle were made through the right level 5A lymph node  Cytopathology was present and deemed the specimens adequate for evaluation  3 specimens were sent to cytology, 3 specimens were sent for thyroglobulin washing, and serum thyroglobulin was obtained and sent to lab for requested analysis  The patient tolerated the procedure well  Postprocedure instructions were provided for the patient  I asked the patient to call us with any questions, concerns, or acute problems  The patient expressed understanding of the above  Impression: Status post successful ultrasound-guided biopsy of the right level 5A lymph node  Specimens were sent for cytology, thyroglobulin washing, and serum thyroglobulin was obtained and sent to the lab for requested analysis  I reviewed the above findings and procedure with Dr Altaf Lagunas  PERFORMED, DICTATED AND SIGNED BY: Wendy Lindsay PA-C Workstation performed: [id number]     I reviewed the above laboratory and imaging data  Discussion/Summary:  81year-old woman, history of thyroid cancer now with metastatic disease in both right and left neck node basins  Plan on bilateral neck dissection  Rationale for this along risks and benefits of surgery including infection, bleeding, spinal accessory nerve injury, discussed  All questions answered and consents signed at this visit  Given prior history of thyroid operation, plan for flexible indirect laryngoscopy as well

## 2022-09-16 ENCOUNTER — TELEPHONE (OUTPATIENT)
Dept: ANESTHESIOLOGY | Facility: CLINIC | Age: 82
End: 2022-09-16

## 2022-09-21 ENCOUNTER — ANESTHESIA EVENT (OUTPATIENT)
Dept: PERIOP | Facility: HOSPITAL | Age: 82
DRG: 822 | End: 2022-09-21
Payer: COMMERCIAL

## 2022-09-27 ENCOUNTER — CONSULT (OUTPATIENT)
Dept: ANESTHESIOLOGY | Facility: CLINIC | Age: 82
End: 2022-09-27
Payer: COMMERCIAL

## 2022-09-27 VITALS
HEART RATE: 80 BPM | SYSTOLIC BLOOD PRESSURE: 128 MMHG | OXYGEN SATURATION: 99 % | TEMPERATURE: 97.8 F | DIASTOLIC BLOOD PRESSURE: 67 MMHG

## 2022-09-27 DIAGNOSIS — C73 THYROID CANCER (HCC): ICD-10-CM

## 2022-09-27 DIAGNOSIS — Z01.89 ENCOUNTER FOR GERIATRIC ASSESSMENT: Primary | ICD-10-CM

## 2022-09-27 PROCEDURE — 99215 OFFICE O/P EST HI 40 MIN: CPT | Performed by: NURSE PRACTITIONER

## 2022-09-27 NOTE — PROGRESS NOTES
[facility]  Consult: Geriatric Surgery     Assessment/Plan:  · 52-year-old female referred to [facility] for pre-surgery geriatric screening secondary to advanced age  · Consult concerns: Advanced age, HTN, HLD  · She is scheduled on  Case: [id number] Date/Time: 10/18/22 1400   Procedures:        BILATERAL NECK DISSECTION, FLEXIBLE LARYNGOSCOPY (Bilateral Neck)       FLEXIBLE LARYNGOSCOPY (N/A Throat)   Anesthesia type: General   Diagnosis: Thyroid cancer ([address] ) [C73]   Pre-op diagnosis: Thyroid cancer ([facility] [address] ) [C73]   Location: [facility] OR ROOM 04 / [facility] MAIN OR   Surgeons: Selwyn Pringle MD       No problem-specific Assessment & Plan notes found for this encounter  Problem List Items Addressed This Visit        Endocrine    Thyroid cancer [facility])  · Seen today for surgical Optimization   · Seen today for geriatric assessment  · Will need MC, scheduled 10 04 22  Needs PATs, EKG, and CHEST XRAY to be done- patient going this week - await results for further needs  BEST   Breathing- instructed to exercise lungs prior to surgery via IS  Eat- discussed increasing protein intake prior to surgery   Sleep/stress- encouraged 8-10 sleep @ night, stress reduction, avoid sick contacts and handwashing   Train- encouraged to remain active     Complaints of dizziness  · No spells within last month  No spells today But, she fell in June from being dizzy  · PCP aware per patient  · NEEDS MC- 10 04 22  · NEEDS BLOOD WORK- GOING THIS WEEK   · NEEDS EKG-   · Await results for all further needs       Other    Encounter for geriatric assessment - Primary  See Geriatric Assessment below    Recommend inpatient geriatric consult after surgery secondary to age and mini cognitive evaluation level 2  Patient has 2 sons, both of them live with her  Patient lives in a rental home  · Cognitive Assessment: 2   · CAM: no   · TUG <15 sec: yes   · Falls (last 6 months): 1 fall in last 6 month, 3 falls in one year   The fall in July 2022 was taking the dog out to go to the bathroom and fell forward, due to feeling dizziness  Sometimes will get lightheaded- has been going on for quite some years  - needs medical clearance scheduled for 10/04/2022  · Hand  score: 18 33lbs   -Attempt 1: 20lbs   -Attempt 2:15lbs   -Attempt 3: 20lbs   · Musa Total Score:  18   · PHQ- 9 Depression Scale: 2   · Nutrition Assessment Score: 10   · Albumin pending   · METS: 6 05     Health goals:  -What are your overall health goals? Walk more  -What brings you strength? Family, friends, her sons   -What activities are important to you? Loves to read      Incentive spirometer teaching completed, instructed to do 10 breathes 4x a day, IS sent home with patient   Surgical soap was given:      High risk for post-op delirium RT age, inpatient surgery, ANESTHESIA   · Mini cognitive screen level- 2 - at risk for post-op delirium  · Inpatient geriatric consult ordered for post-op if admitted     High risk for post- op pneumonia RT age, inpatient surgery  · Incentive spirometer taught to the patient and given  · Instructed to start lung exercises tonight  · Non smoker     High fall risk RT age, inpatient surgery  · Fall precautions on admit  · Standard PT eval after surgery for safe discharge  · Today lower extremity exercises were taught to patient for muscle strengthening and balance  · Exercise routine developed, patient instructed to start tonight, all to be done sitting down only    Pre- frail RT age, inpatient surgery  · Inpatient geriatric consult ordered for postop   · Nutritional supplements- increase protein prior to surgery      · On admission aspiration precautions, skin precautions, delirium precautions, fall precautions    Caregiver strain  · Yes    Be your BEST pathway   Breath, eat, sleep/stress relief, and tracking exercise             Subjective:      Patient ID: Weston Regan is a 81 y o  female who was referred to [facility] for pre surgery geriatric screening 2 2 advanced age  I met patient in the [facility]  She arrived alone  Walks independently  She drove to today's appointment  No walker or no cane use  Lives at home  Her 2 sons live with her  She is the main caregiver for her 2 sons  One son has has mental disabilities  And the other son has an extensive history of cancer and bowel surgeries  She is independent with all ADLs  I did recommend an inpatient geriatric consult after surgery if patient is admitted to the hospital after surgery  Her mini cognitive evaluation was a level 2 indicating high risk for postop delirium  She has a good appetite and she is eating  I did encourage her to increase her protein prior to surgery  I also encouraged her to exercise her lungs with incentive spirometer prior to surgery  Await her PAT's, blood work, EKG, x-ray, and medical clearance for any further needs    As always we discussed having your BEST surgery, and BEST recovery  Surgery goals reviewed today  Breathing exercises   Patient was encouraged to begin lung exercises today  This could be accomplished through deep breathing and cough exercises  Patient was taught how to use an incentive spirometer  Return demonstration provided  Eating/nutrition   Encouraged patient to increase oral protein intake prior to surgery  Based on current weight of   , patient was instructed to consume    Grams of protein a day  This can be accomplished by consuming chicken, fish, tuna fish, cottage cheese, cheese, eggs, [place] yogurt, and protein shakes as needed  I encouraged use of protein shakes such ENLIVE  I also recommended making your own protein shakes with protein powder  Sleep/Stress management  Patient was encouraged to rest their body prior to surgery  Encouraged attempting to get 8 hours of sleep at night  Avoid stress  Avoid sick contacts    Encouraged to find a relaxing hobby such as reading, meditation, listening to music   Training exercises  Patient was encouraged to remain active as possible  Today bilateral lower extremity generic exercises were taught for muscle strengthening and balance  All exercises to be done sitting down  HPI    The following portions of the patient's history were reviewed and updated as appropriate: current medications, past family history, past medical history, past social history, past surgical history and problem list     Review of Systems   Constitutional: Negative for chills and fever  HENT: Negative for ear pain, postnasal drip and sinus pain  Respiratory: Negative for shortness of breath  Gastrointestinal: Negative for nausea, rectal pain and vomiting  Genitourinary: Negative for difficulty urinating  Neurological: Negative for dizziness  Psychiatric/Behavioral: Negative for agitation, behavioral problems, confusion and decreased concentration  Objective:      /67   Pulse 80   Temp 97 8 °F (36 6 °C)   SpO2 99%          Physical Exam  Constitutional:       Appearance: Normal appearance  HENT:      Head: Normocephalic  Nose: Nose normal       Mouth/Throat:      Mouth: Mucous membranes are moist    Cardiovascular:      Rate and Rhythm: Normal rate and regular rhythm  Pulses: Normal pulses  Heart sounds: Normal heart sounds  Pulmonary:      Effort: Pulmonary effort is normal    Abdominal:      Palpations: Abdomen is soft  Musculoskeletal:         General: Normal range of motion  Skin:     General: Skin is warm and dry  Neurological:      General: No focal deficit present  Mental Status: She is alert and oriented to person, place, and time  Mental status is at baseline  Psychiatric:         Mood and Affect: Mood normal          Behavior: Behavior normal          Thought Content:  Thought content normal          Judgment: Judgment normal

## 2022-09-27 NOTE — PROGRESS NOTES
See Geriatric Assessment below    Patient has 2 sons, both of them live with her  Patient lives in a rental home   Cognitive Assessment: 2    CAM: no    TUG <15 sec: yes    Falls (last 6 months): 1 fall in last 6 month, 3 falls in one year  The fall in July 2022 was taking the dog out to go to the bathroom and fell forward, due to feeling dizziness  Sometimes will get lightheaded- has been going on for quite some years   Hand  score: 18 33lbs   -Attempt 1: 20lbs   -Attempt 2:15lbs   -Attempt 3: 20lbs    Musa Total Score:  18    PHQ- 9 Depression Scale: 2    Nutrition Assessment Score: 10    METS: 6 05    Health goals:  -What are your overall health goals? Walk more  -What brings you strength? Family, friends, her sons   -What activities are important to you?    Loves to read     Incentive spirometer teaching completed, instructed to do 10 breathes 4x a day, IS sent home with patient   Surgical soap was given:

## 2022-09-29 ENCOUNTER — TELEPHONE (OUTPATIENT)
Dept: SURGICAL ONCOLOGY | Facility: CLINIC | Age: 82
End: 2022-09-29

## 2022-09-29 RX ORDER — LISINOPRIL 20 MG/1
20 TABLET ORAL DAILY
COMMUNITY
Start: 2022-07-25

## 2022-09-29 RX ORDER — IBUPROFEN 200 MG
TABLET ORAL EVERY 6 HOURS PRN
Status: ON HOLD | COMMUNITY
End: 2022-10-19 | Stop reason: SDUPTHER

## 2022-09-29 RX ORDER — MULTIVITAMIN
1 TABLET ORAL DAILY
COMMUNITY

## 2022-09-29 NOTE — TELEPHONE ENCOUNTER
Medical clearance faxed to [address] [place] [address] on 9/13/22  Re-faxed today, to [phone]  Surgery with Dr Fer Wilks 10/18/22

## 2022-09-29 NOTE — PRE-PROCEDURE INSTRUCTIONS
Pre-Surgery Instructions:   Medication Instructions   • CALCIUM PO Stop taking 7 days prior to surgery  • dorzolamide-timolol (COSOPT) 22 3-6 8 MG/ML ophthalmic solution Take day of surgery  • fluticasone-salmeterol (ADVAIR HFA) 115-21 MCG/ACT inhaler Take day of surgery  • GLUCOSAMINE-CHONDROITIN PO Stop taking 7 days prior to surgery  • ibuprofen (MOTRIN) 200 mg tablet Stop taking 7 days prior to surgery  per pt per surgeon   • levothyroxine 112 mcg tablet Take day of surgery  • lisinopril (ZESTRIL) 20 mg tablet Hold day of surgery  • LUMIGAN 0 01 % ophthalmic drops Take night before surgery   • Multiple Vitamin (multivitamin) tablet Stop taking 7 days prior to surgery  • Omega-3 Fatty Acids (FISH OIL PO) Stop taking 7 days prior to surgery  • prednisoLONE acetate (PRED FORTE) 1 % ophthalmic suspension Take day of surgery  • simvastatin (ZOCOR) 20 mg tablet Take night before surgery   • traMADol (Ultram) 50 mg tablet Uses PRN- DO NOT take day of surgerypt reports for after surgery      Patient  instructed on use of chlorhexidine soap per hospital protocol    Patient instructed to stop all ASA, NSAIDS, vitamins and herbal supplements one week prior to surgery or per Dr Cara Adams   Tylenol is okay to take if needed

## 2022-10-03 ENCOUNTER — HOSPITAL ENCOUNTER (OUTPATIENT)
Dept: RADIOLOGY | Facility: HOSPITAL | Age: 82
Discharge: HOME | End: 2022-10-03
Attending: SURGERY
Payer: COMMERCIAL

## 2022-10-03 ENCOUNTER — APPOINTMENT (OUTPATIENT)
Dept: LAB | Facility: HOSPITAL | Age: 82
End: 2022-10-03
Attending: SURGERY
Payer: COMMERCIAL

## 2022-10-03 DIAGNOSIS — C73 THYROID CANCER (HCC): ICD-10-CM

## 2022-10-03 LAB
ALBUMIN SERPL BCP-MCNC: 3.6 G/DL (ref 3.5–5)
ALP SERPL-CCNC: 89 U/L (ref 46–116)
ALT SERPL W P-5'-P-CCNC: 15 U/L (ref 12–78)
ANION GAP SERPL CALCULATED.3IONS-SCNC: 12 MMOL/L (ref 4–13)
AST SERPL W P-5'-P-CCNC: 13 U/L (ref 5–45)
BASOPHILS # BLD AUTO: 0.03 THOUSANDS/ΜL (ref 0–0.1)
BASOPHILS NFR BLD AUTO: 0 % (ref 0–1)
BILIRUB SERPL-MCNC: 0.3 MG/DL (ref 0.2–1)
BUN SERPL-MCNC: 19 MG/DL (ref 5–25)
CALCIUM SERPL-MCNC: 9.2 MG/DL (ref 8.3–10.1)
CHLORIDE SERPL-SCNC: 106 MMOL/L (ref 96–108)
CO2 SERPL-SCNC: 24 MMOL/L (ref 21–32)
CREAT SERPL-MCNC: 0.71 MG/DL (ref 0.6–1.3)
EOSINOPHIL # BLD AUTO: 0.07 THOUSAND/ΜL (ref 0–0.61)
EOSINOPHIL NFR BLD AUTO: 1 % (ref 0–6)
ERYTHROCYTE [DISTWIDTH] IN BLOOD BY AUTOMATED COUNT: 13.3 % (ref 11.6–15.1)
GFR SERPL CREATININE-BSD FRML MDRD: 80 ML/MIN/1.73SQ M
GLUCOSE P FAST SERPL-MCNC: 76 MG/DL (ref 65–99)
HCT VFR BLD AUTO: 41.7 % (ref 34.8–46.1)
HGB BLD-MCNC: 13.1 G/DL (ref 11.5–15.4)
IMM GRANULOCYTES # BLD AUTO: 0.02 THOUSAND/UL (ref 0–0.2)
IMM GRANULOCYTES NFR BLD AUTO: 0 % (ref 0–2)
LYMPHOCYTES # BLD AUTO: 1.5 THOUSANDS/ΜL (ref 0.6–4.47)
LYMPHOCYTES NFR BLD AUTO: 21 % (ref 14–44)
MCH RBC QN AUTO: 29.2 PG (ref 26.8–34.3)
MCHC RBC AUTO-ENTMCNC: 31.4 G/DL (ref 31.4–37.4)
MCV RBC AUTO: 93 FL (ref 82–98)
MONOCYTES # BLD AUTO: 0.56 THOUSAND/ΜL (ref 0.17–1.22)
MONOCYTES NFR BLD AUTO: 8 % (ref 4–12)
NEUTROPHILS # BLD AUTO: 5 THOUSANDS/ΜL (ref 1.85–7.62)
NEUTS SEG NFR BLD AUTO: 70 % (ref 43–75)
NRBC BLD AUTO-RTO: 0 /100 WBCS
PLATELET # BLD AUTO: 281 THOUSANDS/UL (ref 149–390)
PMV BLD AUTO: 9.9 FL (ref 8.9–12.7)
POTASSIUM SERPL-SCNC: 3.9 MMOL/L (ref 3.5–5.3)
PROT SERPL-MCNC: 7.8 G/DL (ref 6.4–8.4)
RBC # BLD AUTO: 4.49 MILLION/UL (ref 3.81–5.12)
SODIUM SERPL-SCNC: 142 MMOL/L (ref 135–147)
WBC # BLD AUTO: 7.18 THOUSAND/UL (ref 4.31–10.16)

## 2022-10-03 PROCEDURE — 85025 COMPLETE CBC W/AUTO DIFF WBC: CPT

## 2022-10-03 PROCEDURE — 80053 COMPREHEN METABOLIC PANEL: CPT

## 2022-10-03 PROCEDURE — 36415 COLL VENOUS BLD VENIPUNCTURE: CPT

## 2022-10-03 PROCEDURE — 71046 X-RAY EXAM CHEST 2 VIEWS: CPT

## 2022-10-18 ENCOUNTER — ANESTHESIA (OUTPATIENT)
Dept: PERIOP | Facility: HOSPITAL | Age: 82
DRG: 822 | End: 2022-10-18
Payer: COMMERCIAL

## 2022-10-18 ENCOUNTER — HOSPITAL ENCOUNTER (INPATIENT)
Facility: HOSPITAL | Age: 82
LOS: 1 days | Discharge: HOME HEALTH | DRG: 822 | End: 2022-10-19
Attending: SURGERY | Admitting: SURGERY
Payer: COMMERCIAL

## 2022-10-18 DIAGNOSIS — C73 THYROID CANCER (HCC): ICD-10-CM

## 2022-10-18 LAB
ABO GROUP BLD: NORMAL
ABO GROUP BLD: NORMAL
BLD GP AB SCN SERPL QL: NEGATIVE
RH BLD: POSITIVE
RH BLD: POSITIVE
SPECIMEN EXPIRATION DATE: NORMAL

## 2022-10-18 PROCEDURE — 07T10ZZ RESECTION OF RIGHT NECK LYMPHATIC, OPEN APPROACH: ICD-10-PCS | Performed by: SURGERY

## 2022-10-18 PROCEDURE — NC001 PR NO CHARGE: Performed by: SURGERY

## 2022-10-18 PROCEDURE — 86850 RBC ANTIBODY SCREEN: CPT | Performed by: SURGERY

## 2022-10-18 PROCEDURE — 88307 TISSUE EXAM BY PATHOLOGIST: CPT | Performed by: STUDENT IN AN ORGANIZED HEALTH CARE EDUCATION/TRAINING PROGRAM

## 2022-10-18 PROCEDURE — 86900 BLOOD TYPING SEROLOGIC ABO: CPT | Performed by: SURGERY

## 2022-10-18 PROCEDURE — 38720 REMOVAL OF LYMPH NODES NECK: CPT | Performed by: SURGERY

## 2022-10-18 PROCEDURE — 07T20ZZ RESECTION OF LEFT NECK LYMPHATIC, OPEN APPROACH: ICD-10-PCS | Performed by: SURGERY

## 2022-10-18 PROCEDURE — 88305 TISSUE EXAM BY PATHOLOGIST: CPT | Performed by: STUDENT IN AN ORGANIZED HEALTH CARE EDUCATION/TRAINING PROGRAM

## 2022-10-18 PROCEDURE — 0CJS8ZZ INSPECTION OF LARYNX, VIA NATURAL OR ARTIFICIAL OPENING ENDOSCOPIC: ICD-10-PCS | Performed by: SURGERY

## 2022-10-18 PROCEDURE — 88309 TISSUE EXAM BY PATHOLOGIST: CPT | Performed by: STUDENT IN AN ORGANIZED HEALTH CARE EDUCATION/TRAINING PROGRAM

## 2022-10-18 PROCEDURE — 86901 BLOOD TYPING SEROLOGIC RH(D): CPT | Performed by: SURGERY

## 2022-10-18 RX ORDER — PROPOFOL 10 MG/ML
INJECTION, EMULSION INTRAVENOUS CONTINUOUS PRN
Status: DISCONTINUED | OUTPATIENT
Start: 2022-10-18 | End: 2022-10-18

## 2022-10-18 RX ORDER — HYDROMORPHONE HCL/PF 1 MG/ML
0.5 SYRINGE (ML) INJECTION
Status: DISCONTINUED | OUTPATIENT
Start: 2022-10-18 | End: 2022-10-19 | Stop reason: HOSPADM

## 2022-10-18 RX ORDER — SUCCINYLCHOLINE/SOD CL,ISO/PF 100 MG/5ML
SYRINGE (ML) INTRAVENOUS AS NEEDED
Status: DISCONTINUED | OUTPATIENT
Start: 2022-10-18 | End: 2022-10-18

## 2022-10-18 RX ORDER — PROPOFOL 10 MG/ML
INJECTION, EMULSION INTRAVENOUS AS NEEDED
Status: DISCONTINUED | OUTPATIENT
Start: 2022-10-18 | End: 2022-10-18

## 2022-10-18 RX ORDER — SODIUM CHLORIDE, SODIUM LACTATE, POTASSIUM CHLORIDE, CALCIUM CHLORIDE 600; 310; 30; 20 MG/100ML; MG/100ML; MG/100ML; MG/100ML
100 INJECTION, SOLUTION INTRAVENOUS CONTINUOUS
Status: DISCONTINUED | OUTPATIENT
Start: 2022-10-18 | End: 2022-10-18

## 2022-10-18 RX ORDER — SIMVASTATIN 10 MG
20 TABLET ORAL
Status: DISCONTINUED | OUTPATIENT
Start: 2022-10-18 | End: 2022-10-19 | Stop reason: HOSPADM

## 2022-10-18 RX ORDER — ALBUMIN, HUMAN INJ 5% 5 %
SOLUTION INTRAVENOUS CONTINUOUS PRN
Status: DISCONTINUED | OUTPATIENT
Start: 2022-10-18 | End: 2022-10-18

## 2022-10-18 RX ORDER — LIDOCAINE HYDROCHLORIDE 10 MG/ML
INJECTION, SOLUTION EPIDURAL; INFILTRATION; INTRACAUDAL; PERINEURAL AS NEEDED
Status: DISCONTINUED | OUTPATIENT
Start: 2022-10-18 | End: 2022-10-18 | Stop reason: HOSPADM

## 2022-10-18 RX ORDER — SODIUM CHLORIDE, SODIUM LACTATE, POTASSIUM CHLORIDE, CALCIUM CHLORIDE 600; 310; 30; 20 MG/100ML; MG/100ML; MG/100ML; MG/100ML
INJECTION, SOLUTION INTRAVENOUS CONTINUOUS PRN
Status: DISCONTINUED | OUTPATIENT
Start: 2022-10-18 | End: 2022-10-18

## 2022-10-18 RX ORDER — SODIUM CHLORIDE, SODIUM GLUCONATE, SODIUM ACETATE, POTASSIUM CHLORIDE, MAGNESIUM CHLORIDE, SODIUM PHOSPHATE, DIBASIC, AND POTASSIUM PHOSPHATE .53; .5; .37; .037; .03; .012; .00082 G/100ML; G/100ML; G/100ML; G/100ML; G/100ML; G/100ML; G/100ML
75 INJECTION, SOLUTION INTRAVENOUS CONTINUOUS
Status: DISPENSED | OUTPATIENT
Start: 2022-10-18 | End: 2022-10-19

## 2022-10-18 RX ORDER — FENTANYL CITRATE/PF 50 MCG/ML
25 SYRINGE (ML) INJECTION
Status: DISCONTINUED | OUTPATIENT
Start: 2022-10-18 | End: 2022-10-18

## 2022-10-18 RX ORDER — OMEPRAZOLE 10 MG/1
10 CAPSULE, DELAYED RELEASE ORAL DAILY
COMMUNITY

## 2022-10-18 RX ORDER — CALCIUM CARBONATE 200(500)MG
500 TABLET,CHEWABLE ORAL 3 TIMES DAILY PRN
Status: DISCONTINUED | OUTPATIENT
Start: 2022-10-18 | End: 2022-10-19 | Stop reason: HOSPADM

## 2022-10-18 RX ORDER — FENTANYL CITRATE 50 UG/ML
INJECTION, SOLUTION INTRAMUSCULAR; INTRAVENOUS AS NEEDED
Status: DISCONTINUED | OUTPATIENT
Start: 2022-10-18 | End: 2022-10-18

## 2022-10-18 RX ORDER — ALBUTEROL SULFATE 2.5 MG/3ML
2.5 SOLUTION RESPIRATORY (INHALATION) ONCE AS NEEDED
Status: DISCONTINUED | OUTPATIENT
Start: 2022-10-18 | End: 2022-10-18

## 2022-10-18 RX ORDER — HEPARIN SODIUM 5000 [USP'U]/ML
5000 INJECTION, SOLUTION INTRAVENOUS; SUBCUTANEOUS EVERY 8 HOURS SCHEDULED
Status: DISCONTINUED | OUTPATIENT
Start: 2022-10-18 | End: 2022-10-19 | Stop reason: HOSPADM

## 2022-10-18 RX ORDER — PREDNISOLONE ACETATE 10 MG/ML
1 SUSPENSION/ DROPS OPHTHALMIC 2 TIMES DAILY
Status: DISCONTINUED | OUTPATIENT
Start: 2022-10-18 | End: 2022-10-19 | Stop reason: HOSPADM

## 2022-10-18 RX ORDER — ONDANSETRON 2 MG/ML
4 INJECTION INTRAMUSCULAR; INTRAVENOUS EVERY 6 HOURS PRN
Status: DISCONTINUED | OUTPATIENT
Start: 2022-10-18 | End: 2022-10-19 | Stop reason: HOSPADM

## 2022-10-18 RX ORDER — ACETAMINOPHEN 325 MG/1
650 TABLET ORAL EVERY 6 HOURS PRN
Status: DISCONTINUED | OUTPATIENT
Start: 2022-10-18 | End: 2022-10-19 | Stop reason: HOSPADM

## 2022-10-18 RX ORDER — EPHEDRINE SULFATE 50 MG/ML
INJECTION INTRAVENOUS AS NEEDED
Status: DISCONTINUED | OUTPATIENT
Start: 2022-10-18 | End: 2022-10-18

## 2022-10-18 RX ORDER — DIPHENHYDRAMINE HYDROCHLORIDE 50 MG/ML
12.5 INJECTION INTRAMUSCULAR; INTRAVENOUS ONCE AS NEEDED
Status: DISCONTINUED | OUTPATIENT
Start: 2022-10-18 | End: 2022-10-18

## 2022-10-18 RX ORDER — ONDANSETRON 2 MG/ML
INJECTION INTRAMUSCULAR; INTRAVENOUS AS NEEDED
Status: DISCONTINUED | OUTPATIENT
Start: 2022-10-18 | End: 2022-10-18

## 2022-10-18 RX ORDER — DORZOLAMIDE HYDROCHLORIDE AND TIMOLOL MALEATE 20; 5 MG/ML; MG/ML
1 SOLUTION/ DROPS OPHTHALMIC 2 TIMES DAILY
Status: DISCONTINUED | OUTPATIENT
Start: 2022-10-18 | End: 2022-10-19 | Stop reason: HOSPADM

## 2022-10-18 RX ORDER — LEVOTHYROXINE SODIUM 112 UG/1
112 TABLET ORAL
Status: DISCONTINUED | OUTPATIENT
Start: 2022-10-19 | End: 2022-10-19 | Stop reason: HOSPADM

## 2022-10-18 RX ORDER — HYDROMORPHONE HCL IN WATER/PF 6 MG/30 ML
0.2 PATIENT CONTROLLED ANALGESIA SYRINGE INTRAVENOUS
Status: DISCONTINUED | OUTPATIENT
Start: 2022-10-18 | End: 2022-10-18

## 2022-10-18 RX ORDER — ONDANSETRON 2 MG/ML
4 INJECTION INTRAMUSCULAR; INTRAVENOUS ONCE AS NEEDED
Status: DISCONTINUED | OUTPATIENT
Start: 2022-10-18 | End: 2022-10-18

## 2022-10-18 RX ORDER — HYDROMORPHONE HCL/PF 1 MG/ML
SYRINGE (ML) INJECTION AS NEEDED
Status: DISCONTINUED | OUTPATIENT
Start: 2022-10-18 | End: 2022-10-18

## 2022-10-18 RX ORDER — SODIUM CHLORIDE 9 MG/ML
INJECTION, SOLUTION INTRAVENOUS CONTINUOUS PRN
Status: DISCONTINUED | OUTPATIENT
Start: 2022-10-18 | End: 2022-10-18

## 2022-10-18 RX ORDER — DEXAMETHASONE SODIUM PHOSPHATE 10 MG/ML
INJECTION, SOLUTION INTRAMUSCULAR; INTRAVENOUS AS NEEDED
Status: DISCONTINUED | OUTPATIENT
Start: 2022-10-18 | End: 2022-10-18

## 2022-10-18 RX ORDER — OXYCODONE HYDROCHLORIDE 5 MG/1
5 TABLET ORAL EVERY 4 HOURS PRN
Status: DISCONTINUED | OUTPATIENT
Start: 2022-10-18 | End: 2022-10-19 | Stop reason: HOSPADM

## 2022-10-18 RX ORDER — OXYCODONE HYDROCHLORIDE 5 MG/1
10 TABLET ORAL EVERY 4 HOURS PRN
Status: DISCONTINUED | OUTPATIENT
Start: 2022-10-18 | End: 2022-10-19 | Stop reason: HOSPADM

## 2022-10-18 RX ADMIN — Medication 25 MCG: at 19:50

## 2022-10-18 RX ADMIN — Medication 100 MG: at 16:39

## 2022-10-18 RX ADMIN — HYDROMORPHONE HYDROCHLORIDE 0.25 MG: 1 INJECTION, SOLUTION INTRAMUSCULAR; INTRAVENOUS; SUBCUTANEOUS at 17:49

## 2022-10-18 RX ADMIN — SODIUM CHLORIDE, SODIUM LACTATE, POTASSIUM CHLORIDE, AND CALCIUM CHLORIDE 100 ML/HR: .6; .31; .03; .02 INJECTION, SOLUTION INTRAVENOUS at 14:03

## 2022-10-18 RX ADMIN — Medication 25 MCG: at 20:40

## 2022-10-18 RX ADMIN — PROPOFOL 130 MG: 10 INJECTION, EMULSION INTRAVENOUS at 16:38

## 2022-10-18 RX ADMIN — DORZOLAMIDE HYDROCHLORIDE AND TIMOLOL MALEATE 1 DROP: 20; 5 SOLUTION/ DROPS OPHTHALMIC at 23:25

## 2022-10-18 RX ADMIN — PROPOFOL 40 MCG/KG/MIN: 10 INJECTION, EMULSION INTRAVENOUS at 16:45

## 2022-10-18 RX ADMIN — BIMATOPROST 1 DROP: 0.1 SOLUTION/ DROPS OPHTHALMIC at 23:24

## 2022-10-18 RX ADMIN — PHENYLEPHRINE HYDROCHLORIDE 2 DROP: 1 SPRAY NASAL at 16:32

## 2022-10-18 RX ADMIN — EPHEDRINE SULFATE 5 MG: 50 INJECTION INTRAVENOUS at 17:06

## 2022-10-18 RX ADMIN — FENTANYL CITRATE 50 MCG: 50 INJECTION INTRAMUSCULAR; INTRAVENOUS at 16:38

## 2022-10-18 RX ADMIN — ONDANSETRON 4 MG: 2 INJECTION INTRAMUSCULAR; INTRAVENOUS at 16:39

## 2022-10-18 RX ADMIN — FENTANYL CITRATE 50 MCG: 50 INJECTION INTRAMUSCULAR; INTRAVENOUS at 17:11

## 2022-10-18 RX ADMIN — DEXAMETHASONE SODIUM PHOSPHATE 10 MG: 10 INJECTION, SOLUTION INTRAMUSCULAR; INTRAVENOUS at 16:39

## 2022-10-18 RX ADMIN — SODIUM CHLORIDE, SODIUM LACTATE, POTASSIUM CHLORIDE, AND CALCIUM CHLORIDE: .6; .31; .03; .02 INJECTION, SOLUTION INTRAVENOUS at 16:25

## 2022-10-18 RX ADMIN — SODIUM CHLORIDE, SODIUM GLUCONATE, SODIUM ACETATE, POTASSIUM CHLORIDE AND MAGNESIUM CHLORIDE 75 ML/HR: 526; 502; 368; 37; 30 INJECTION, SOLUTION INTRAVENOUS at 19:55

## 2022-10-18 RX ADMIN — EPHEDRINE SULFATE 5 MG: 50 INJECTION INTRAVENOUS at 17:18

## 2022-10-18 RX ADMIN — ALBUMIN (HUMAN): 12.5 SOLUTION INTRAVENOUS at 17:19

## 2022-10-18 RX ADMIN — CALCIUM CARBONATE (ANTACID) CHEW TAB 500 MG 500 MG: 500 CHEW TAB at 23:23

## 2022-10-18 RX ADMIN — HEPARIN SODIUM 5000 UNITS: 5000 INJECTION INTRAVENOUS; SUBCUTANEOUS at 22:44

## 2022-10-18 RX ADMIN — PHENYLEPHRINE HYDROCHLORIDE 20 MCG/MIN: 10 INJECTION INTRAVENOUS at 17:22

## 2022-10-18 RX ADMIN — PREDNISOLONE ACETATE 1 DROP: 10 SUSPENSION/ DROPS OPHTHALMIC at 23:24

## 2022-10-18 RX ADMIN — SODIUM CHLORIDE: 0.9 INJECTION, SOLUTION INTRAVENOUS at 16:46

## 2022-10-18 NOTE — OP NOTE
OPERATIVE REPORT  PATIENT NAME: Weston Regan    :  1940  MRN: [id number]  Pt Location: BE OR ROOM 04    SURGERY DATE: 10/18/2022    Surgeon(s) and Role:     * Vinh Hay MD - Primary    Preop Diagnosis:  Thyroid cancer ([address] ) [C73]    Post-Op Diagnosis Codes: * Thyroid cancer ([address] ) [C73]    Procedure(s) (LRB):  BILATERAL NECK DISSECTION, FLEXIBLE LARYNGOSCOPY (Bilateral)  FLEXIBLE LARYNGOSCOPY (N/A)    Specimen(s):  ID Type Source Tests Collected by Time Destination   1 : right modified neck dissection-level 3 lymph nodes Tissue Neck TISSUE EXAM Vinh Hay MD 10/18/2022 1749    2 : right level 2 Tissue Neck TISSUE EXAM Vinh Hay MD 10/18/2022 1800    3 : left level 4 lymph node Tissue Lymph Node TISSUE EXAM Vinh Hay MD 10/18/2022 1833        Estimated Blood Loss:   Minimal    Drains:  Closed/Suction Drain Right Neck Bulb 10 Fr  (Active)   Number of days: 0       Closed/Suction Drain Left Neck Bulb 10 Fr  (Active)   Number of days: 0       Urethral Catheter Latex 16 Fr  (Active)   Number of days: 0       Anesthesia Type:   General    Operative Indications:  Thyroid cancer ([address] ) [C73]  Metastatic to cervical nodes    Operative Findings:  Right level 3 adenopathy; left level 4 adenopathy    Complications:   None    Procedure and Technique:  The patient was brought the OR and identified by proper time-out  Following this she was intubated by the anesthesia team, then positioned With a shoulder roll behind the scapula I and the neck in sniffing position  The patient was then prepped draped in the usual fashion with the right/left neck exposed and the head in sniffing positioin  Local anesthesia was administered, then a curvilinear incision was made extending from the right upper neck just anterior to the border of the SCM and extended down along the anterior border the SCM down to the suprasternal notch  Cautery was used to dissect through the dermis and subcutaneous tissue  The platysma was transected in hemostatic fashion with cautery  Flaps were raised on either side of the incision, and retracted with 3-0 silk sutures  We then proceeded to mobilize the lymphatic packet off the anterior surface of the SCM with careful dissection  Small vessels were clipped and/or tied off as needed  Dissection was performed all the way from the level 2/3 border extending down towards the omohyoid muscle  Once this posterior border of the packet was freed, we concentrated anteriorly  We able to free the lymphatic packet include level 2 and 3 lymph nodes by dissecting anteriorly until the strap muscles were visualized  Vessels were clipped and were tied off in the process  We were able to identify and preserve the internal jugular vein as well as the carotid artery  The lymphatic packet weas dissected off the jugular vein along its length  The lymphatic packet was then mobilized off its posterior attachments with care to avoid injury to the spinal accessory nerve  The lymphatic packet was sent to pathology as levels 2 and,3 lymph nodes  The field was then irrigated  We then left a 10 [place] drain in the field and anchored it to the skin with a 3-0 nylon suture  After hemostasis was ensured, the platysma was closed using 3-0 Vicryl sutures in running fashion followed by 4 0 Vicryl to close the dermis followed by 5-0 Monocryl to close the skin in subcuticular fashion  Skin glue was applied  We then turned our attention to the left side  Radiographically, nodes were low level 4 lymph nodes  We therefore concentrated our neck dissection in the lower aspect of the neck  Local anesthesia was administered, then a curvilinear incision was made extending from the left mid neck just anterior to the border of the SCM and extended down along the anterior border the SCM down to the suprasternal notch  Cautery was used to dissect through the dermis and subcutaneous tissue   The platysma was transected in hemostatic fashion with cautery  Flaps were raised on either side of the incision, and retracted with 3-0 silk sutures  The level 4 lymph node was palpable and visible between the heads of the sternocleidomastoid muscle  We therefore proceeded to mobilize the lymphatic packet from out of this triangle  The oval high and was adherent to the mass it was therefore transected to allow for resection  Small vessels were clipped to allow for hemostasis  Dissection included the low level 4 lymph nodes extending down towards the clavicle and suprasternal notch medially  The packet was then sent to pathology as left level 4 lymph nodes  We then left a 10 [place] drain in the field and anchored it to the skin with a 3-0 nylon suture  After hemostasis was ensured, the platysma was closed using 3-0 Vicryl sutures in running fashion followed by 4 0 Vicryl to close the dermis followed by 5-0 Monocryl to close the skin in subcuticular fashion  Skin glue was applied  The patient tolerated the procedure well  Sponge and instrument counts were correct at the end the case       I was present for the entire procedure    Patient Disposition:  PACU         SIGNATURE: Connie Dill MD  DATE: October 18, 2022  TIME: 7:12 PM

## 2022-10-18 NOTE — ANESTHESIA POSTPROCEDURE EVALUATION
Post-Op Assessment Note    CV Status:  Stable  Pain Score: 0    Pain management: adequate     Mental Status:  Arousable and awake   Hydration Status:  Euvolemic   PONV Controlled:  Controlled   Airway Patency:  Patent   Two or more mitigation strategies used for obstructive sleep apnea   Post Op Vitals Reviewed: Yes      Staff: CRNA   Comments: Pt arousable, able to maintain own airway, VSS, report to recovery RN        No complications documented      BP   166/64   Temp 100 °F (37 8 °C) (10/18/22 1926)    Pulse  79   Resp   16   SpO2   95%

## 2022-10-18 NOTE — H&P
Surgical Oncology Follow Up                                        [address]  [address]  [facility] [address] [phone]     Reza Friendly  1940  [id number]  [address] [facility]  [address] [place] [address] [phone]         Chief Complaint   Patient presents with   • Follow-up         Assessment/Plan:     No problem-specific Assessment & Plan notes found for this encounter          Diagnoses and all orders for this visit:     Thyroid cancer [facility])         Advance Care Planning/Advance Directives:  Discussed disease status, cancer treatment plans and/or cancer treatment goals with the patient           Oncology History   Thyroid cancer ([facility] )   6/28/2021 Surgery     Slides reviewed by [facility] pathology:  1  Slides (6) labeled [id number] from [facility] (obtained on: 11/17/2016):   A  Thyroid, right, lobectomy:   -  Papillary thyroid carcinoma, tall cell variant, with necrosis (high grade papillary carcinoma),3 2 cm, unencapsulated,    - Background thyroid tissue with adenomatous nodule  B  Thyroid, left lobe, subtotal lobectomy:   - Thyroid tissue with adenomatous nodule  2  Slides (3) labeled  from [facility] (obtained on: 01/21/2020)   A   Lymph node, right neck, biopsy:   -  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; 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        8/8/2022 Biopsy     Neck, Soft Tissue Mass, Left neck Ultrasound-guided Needle Biopsy (3 slides [id number] [facility], collected 7/12/2022):  - Papillary thyroid carcinoma, focally invading fibrous tissue           8/26/2022 Surgery     Thyroid, Thyroid Right Lobectomy Subtotal Left Lobectomy Thyroid Tissue ( 6 slides [facility], collected 11/17/16):  A) Right thyroid lobectomy and sub total left lobectomy (6 slides):  - Papillary thyroid carcinoma, tall cell variant, with necrosis (high grade papillary carcinoma),3 2 cm, unencapsulated  - Surgical margins appear negative for carcinoma    - Background nodular adenomatous hyperplasia  B  Thyroid, :   - Nodular adenomatous hyperplasia of thyroid       9/6/2022 Biopsy     Lymph Node, Right, Zone 5A (ThinPrep and smear preparations):  Conclusive Evidence of Malignancy  Metastatic carcinoma  , consistent with Metastasis from the patient know papillary thyroid carcinoma             History of Present Illness:  Patient is an 81-year-old woman here for follow-up status post right cervical biopsy  She has a known history of left cervical node metastasis from thyroid cancer   -Interval History:  No new complaints since her last visit      Review of Systems:  Review of Systems   Constitutional: Negative  HENT: Negative  Eyes: Negative  Respiratory: Negative  Cardiovascular: Negative  Gastrointestinal: Negative  Endocrine: Negative  Genitourinary: Negative  Musculoskeletal: Negative  Skin: Negative  Allergic/Immunologic: Negative  Neurological: Negative  Hematological: Negative  Psychiatric/Behavioral: Negative  All other systems reviewed and are negative             Patient Active Problem List   Diagnosis   • Postoperative hypothyroidism   • Thyroid cancer [facility])      Medical History   No past medical history on file  Surgical History         Past Surgical History:   Procedure Laterality Date   • US GUIDED LYMPH NODE BIOPSY LEFT   9/6/2022   • US GUIDED THYROID BIOPSY   11/7/2016         Family History         Family History   Problem Relation Age of Onset   • Heart disease Mother     • Heart disease Father     • Lupus Sister           Social History               Socioeconomic History   • Marital status:         Spouse name: Not on file   • Number of children: Not on file   • Years of education: Not on file   • Highest education level: Not on file   Occupational History   • Not on file   Tobacco Use   • Smoking status: Never Smoker   • Smokeless tobacco: Never Used   Substance and Sexual Activity   • Alcohol use: Not on file   • Drug use: Not on file   • Sexual activity: Not on file   Other Topics Concern   • Not on file   Social History Narrative   • Not on file      Social Determinants of Health      Financial Resource Strain: Not on file   Food Insecurity: Not on file   Transportation Needs: Not on file   Physical Activity: Not on file   Stress: Not on file   Social Connections: Not on file   Intimate Partner Violence: Not on file   Housing Stability: Not on file            Current Outpatient Medications:   •  dorzolamide-timolol (COSOPT) 22 3-6 8 MG/ML ophthalmic solution, INSTILL 1 DROP LEFT EYE 2 TIMES A DAY, Disp: , Rfl:   •  fluticasone-salmeterol (ADVAIR HFA) 115-21 MCG/ACT inhaler, Inhale 2 puffs 2 (two) times a day Rinse mouth after use , Disp: , Rfl:   •  levothyroxine 112 mcg tablet, 1 tab daily 6 days of the week, 2 tabs on sundays  , Disp: 102 tablet, Rfl: 3  •  lisinopril (ZESTRIL) 10 mg tablet, Take 20 mg by mouth daily , Disp: , Rfl: 1  •  LUMIGAN 0 01 % ophthalmic drops, INSTILL 1 DROP INTO LEFT EYE IN THE EVENING, Disp: , Rfl:   •  prednisoLONE acetate (PRED FORTE) 1 % ophthalmic suspension, 1 drop 2 (two) times a day, Disp: , Rfl:   •  simvastatin (ZOCOR) 20 mg tablet, Take 20 mg by mouth daily at bedtime, Disp: , Rfl: 1  •  alendronate (FOSAMAX) 70 mg tablet, Take 70 mg by mouth once a week (Patient not taking: No sig reported), Disp: , Rfl: 3  •  SYMBICORT 160-4 5 MCG/ACT inhaler, 2 PUFF INHALATION DAILY (Patient not taking: No sig reported), Disp: , Rfl: 5       Allergies   Allergen Reactions   • Amoxicillin GI Intolerance          Vitals:   Ht 4' 9" (1 448 m)   Wt 74 8 kg (165 lb)   BMI 35 71 kg/m²     Physical Exam  Constitutional:       Appearance: Normal appearance  HENT:      Head: Normocephalic and atraumatic  Right Ear: External ear normal       Left Ear: External ear normal       Mouth/Throat:      Mouth: Mucous membranes are dry  Eyes:      Extraocular Movements: Extraocular movements intact  Pupils: Pupils are equal, round, and reactive to light  Neck:      Comments: Palpable bilateral cervical adenopathy  Right level 5, and left level 4 nodes noted  Cardiovascular:      Rate and Rhythm: Normal rate and regular rhythm  Pulses: Normal pulses  Heart sounds: Normal heart sounds  Pulmonary:      Breath sounds: Normal breath sounds  Abdominal:      General: Abdomen is flat  Musculoskeletal:         General: Normal range of motion  Cervical back: Normal range of motion and neck supple  No rigidity or tenderness  Lymphadenopathy:      Cervical: No cervical adenopathy  Skin:     General: Skin is warm and dry  Neurological:      General: No focal deficit present  Mental Status: She is alert and oriented to person, place, and time  Psychiatric:         Mood and Affect: Mood normal          Behavior: Behavior normal                Results:  Labs:  FNA thyroglobulin level in right level 5 lymph node showing thyroglobulin of 1336 ng/mL  Histology confirming malignant papillary thyroid cancer level 5A lymph node, dated September 6, 2022      Imaging  CT soft tissue neck w contrast     Result Date: 9/7/2022  Narrative: CT NECK WITH CONTRAST INDICATION:   C73: Malignant neoplasm of thyroid gland  COMPARISON:  Outside noncontrast CT Sandhya 15, 2022  TECHNIQUE:  Axial, sagittal, and coronal 2D reformatted images were created from the axial source data and submitted for interpretation  Radiation dose length product (DLP) for this visit:  543 32 mGy-cm   This examination, like all CT scans performed in the [facility], was performed utilizing techniques to minimize radiation dose exposure, including the use of iterative  reconstruction and automated exposure control   IV Contrast:  70 mL of iohexol (OMNIPAQUE) IMAGE QUALITY:  Diagnostic  FINDINGS: VISUALIZED BRAIN PARENCHYMA:  No acute intracranial pathology of the visualized brain parenchyma  VISUALIZED ORBITS AND PARANASAL SINUSES:  Bilateral cataract surgery  NASAL CAVITY AND NASOPHARYNX:  Normal  SUPRAHYOID NECK:  Normal oral cavity, tongue base, tonsillar fossa and epiglottis  INFRAHYOID NECK:  Aryepiglottic folds and piriform sinuses are normal   Normal glottis and subglottic airway  THYROID GLAND:  Patient is status post thyroidectomy  PAROTID AND SUBMANDIBULAR GLANDS:  Normal  LYMPH NODES:  Trudi mass right posterior triangle series 2 image 53 measures 1 7 x 1 3 x 2 8 cm  There are additional smaller posterior triangle nodes identified  There is a left supraclavicular mass identified measuring 2 8 x 3 0 x 1 8 cm  VASCULAR STRUCTURES:  Normal enhancement of the cervical vasculature  THORACIC INLET:  Lung apices and upper mediastinum are unremarkable  BONY STRUCTURES: No acute fracture or destructive osseous lesion       Impression: Right posterior triangle trudi mass and left supraclavicular trudi mass noted similar in size to the prior noncontrast CT  Additional smaller lymph nodes are also identified in the inferior aspect of the posterior triangle bilaterally  Patient status post thyroidectomy  No obvious recurrent tumor identified within the thyroidectomy bed  Workstation performed: [id number]      US head neck lymph node mapping     Result Date: 8/30/2022  Narrative: NECK ULTRASOUND INDICATION:     C73: Malignant neoplasm of thyroid gland  COMPARISON:  Multiple priors most recently ultrasound-guided biopsy from outside institution 7/12/2022 FINDINGS: Ultrasound of the thyroidectomy bed and cervical lymph node chains was performed with a high frequency linear transducer  There is no suspicion of recurrent mass in the thyroidectomy bed    Residual left lobe thyroid gland measuring 0 8 x 1 7 x 0 6 cm, without suspicious nodule or mass  Right level 5A enlarged hypoechoic lymph node measuring 2 7 x 1 3 x 1 8 cm, similar in size and upper differences in technique compared to ultrasound of 5/20/2021, at which time it was measured at 2 3 x 1 1 x 2 1 cm  Left supra clavicular mass previously biopsied measuring 2 9 x 1 8 x 2 2 cm, increased in size from ultrasound of 5/20/2021, but measuring similar size to the ultrasound-guided biopsy of 7/12/2022  No evidence for microcalcification or focal nodularity       Impression: Enlarged right level 5A lymph node, as described  Previously biopsied left supra clavicular mass similar in size to ultrasound-guided biopsy of 7/12/2022  Workstation performed: [id number]      US guided lymph node biopsy right with thyroglobulin blood test     Result Date: 9/6/2022  Narrative: ULTRASOUND-GUIDED NECK LYMPH NODE BIOPSY HISTORY: 81year-old female with a history of metastatic thyroid cancer status post right thyroidectomy and left subtotal thyroidectomy in 2016  Recent left supraclavicular lymph node biopsy on 7/12/2022 at [facility] indicating papillary thyroid carcinoma  Recent evidence of right cervical neck lymphadenopathy  Evaluate for metastatic spread  COMPARISON: Head and neck lymph node mapping ultrasound on 8/30/2022  CT of the soft tissue neck with contrast on 9/3/2022  FINDINGS: On prior head and neck lymph node mapping from 8/30/2022, there was a right level 5A abnormal appearing lymph node measuring 2 7 x 1 3 x 1 8 cm  The patient presents today with a prescription to undergo FNA of the right level 5A lymph node with thyroglobulin washing and thyroglobulin serum analysis  On today's limited ultrasound, the right level 5A lymph node measured 2 8 x 1 4 x 1 5 cm  The procedure was explained to the patient, including risks of hemorrhage, infection and local injury  Informed consent was freely obtained   The patient verbalized expressed understanding of the above risks and wished to proceed with the procedure  Final standard "time-out" procedure performed  PROCEDURE: The neck was prepped and draped in normal sterile fashion  Under real-time ultrasound guidance and local anesthesia 6 passes with a 25-gauge needle were made through the right level 5A lymph node  Cytopathology was present and deemed the specimens adequate for evaluation  3 specimens were sent to cytology, 3 specimens were sent for thyroglobulin washing, and serum thyroglobulin was obtained and sent to lab for requested analysis  The patient tolerated the procedure well  Postprocedure instructions were provided for the patient  I asked the patient to call us with any questions, concerns, or acute problems  The patient expressed understanding of the above       Impression: Status post successful ultrasound-guided biopsy of the right level 5A lymph node  Specimens were sent for cytology, thyroglobulin washing, and serum thyroglobulin was obtained and sent to the lab for requested analysis  I reviewed the above findings and procedure with Dr Nelsy Tamayo  PERFORMED, DICTATED AND SIGNED BY: Deysi Regalado PA-C Workstation performed: [id number]      I reviewed the above laboratory and imaging data      Discussion/Summary:  81year-old woman, history of thyroid cancer now with metastatic disease in both right and left neck node basins  Plan on bilateral neck dissection  Rationale for this along risks and benefits of surgery including infection, bleeding, spinal accessory nerve injury, discussed  All questions answered and consents signed at this visit  Given prior history of thyroid operation, plan for flexible indirect laryngoscopy as well

## 2022-10-18 NOTE — INTERVAL H&P NOTE
H&P reviewed  After examining the patient I find no changes in the patients condition since the H&P had been written      Vitals:    10/18/22 1226   BP: 150/82   Pulse: 66   Resp: 16   Temp: 98 1 °F (36 7 °C)   SpO2: 98%

## 2022-10-18 NOTE — ANESTHESIA PREPROCEDURE EVALUATION
Procedure:  BILATERAL NECK DISSECTION, FLEXIBLE LARYNGOSCOPY (Bilateral Neck)  FLEXIBLE LARYNGOSCOPY (N/A Throat)    Relevant Problems   ENDO   (+) Postoperative hypothyroidism        Physical Exam    Airway    Mallampati score: II  TM Distance: >3 FB  Neck ROM: full     Dental   No notable dental hx     Cardiovascular  Cardiovascular exam normal    Pulmonary  Pulmonary exam normal     Other Findings     Normal sinus rhythm  Low voltage QRS  Septal infarct , age undetermined  Abnormal ECG  No previous ECGs available    Anesthesia Plan  ASA Score- 2     Anesthesia Type- general with ASA Monitors  Additional Monitors: arterial line  Airway Plan:           Plan Factors-Exercise tolerance (METS): >4 METS  Chart reviewed  EKG reviewed  Imaging results reviewed  Existing labs reviewed  Patient summary reviewed  Patient is not a current smoker  Patient not instructed to abstain from smoking on day of procedure  Patient did not smoke on day of surgery  Obstructive sleep apnea risk education given perioperatively  Induction-     Postoperative Plan- Plan for postoperative opioid use  Informed Consent- Anesthetic plan and risks discussed with patient  I personally reviewed this patient with the CRNA  Discussed and agreed on the Anesthesia Plan with the CRNA  Sherman Dias

## 2022-10-19 VITALS
OXYGEN SATURATION: 94 % | HEART RATE: 67 BPM | BODY MASS INDEX: 35.6 KG/M2 | DIASTOLIC BLOOD PRESSURE: 63 MMHG | RESPIRATION RATE: 20 BRPM | WEIGHT: 165 LBS | HEIGHT: 57 IN | SYSTOLIC BLOOD PRESSURE: 142 MMHG | TEMPERATURE: 98.5 F

## 2022-10-19 LAB
ANION GAP SERPL CALCULATED.3IONS-SCNC: 6 MMOL/L (ref 4–13)
BASOPHILS # BLD AUTO: 0 THOUSANDS/ΜL (ref 0–0.1)
BASOPHILS NFR BLD AUTO: 0 % (ref 0–1)
BUN SERPL-MCNC: 17 MG/DL (ref 5–25)
CALCIUM SERPL-MCNC: 8.7 MG/DL (ref 8.3–10.1)
CHLORIDE SERPL-SCNC: 108 MMOL/L (ref 96–108)
CO2 SERPL-SCNC: 23 MMOL/L (ref 21–32)
CREAT SERPL-MCNC: 0.61 MG/DL (ref 0.6–1.3)
EOSINOPHIL # BLD AUTO: 0 THOUSAND/ΜL (ref 0–0.61)
EOSINOPHIL NFR BLD AUTO: 0 % (ref 0–6)
ERYTHROCYTE [DISTWIDTH] IN BLOOD BY AUTOMATED COUNT: 13.1 % (ref 11.6–15.1)
GFR SERPL CREATININE-BSD FRML MDRD: 85 ML/MIN/1.73SQ M
GLUCOSE P FAST SERPL-MCNC: 144 MG/DL (ref 65–99)
GLUCOSE SERPL-MCNC: 144 MG/DL (ref 65–140)
HCT VFR BLD AUTO: 39.4 % (ref 34.8–46.1)
HGB BLD-MCNC: 12.4 G/DL (ref 11.5–15.4)
IMM GRANULOCYTES # BLD AUTO: 0.03 THOUSAND/UL (ref 0–0.2)
IMM GRANULOCYTES NFR BLD AUTO: 0 % (ref 0–2)
LYMPHOCYTES # BLD AUTO: 0.82 THOUSANDS/ΜL (ref 0.6–4.47)
LYMPHOCYTES NFR BLD AUTO: 9 % (ref 14–44)
MCH RBC QN AUTO: 29 PG (ref 26.8–34.3)
MCHC RBC AUTO-ENTMCNC: 31.5 G/DL (ref 31.4–37.4)
MCV RBC AUTO: 92 FL (ref 82–98)
MONOCYTES # BLD AUTO: 0.22 THOUSAND/ΜL (ref 0.17–1.22)
MONOCYTES NFR BLD AUTO: 2 % (ref 4–12)
NEUTROPHILS # BLD AUTO: 7.94 THOUSANDS/ΜL (ref 1.85–7.62)
NEUTS SEG NFR BLD AUTO: 89 % (ref 43–75)
NRBC BLD AUTO-RTO: 0 /100 WBCS
PLATELET # BLD AUTO: 263 THOUSANDS/UL (ref 149–390)
PMV BLD AUTO: 9.6 FL (ref 8.9–12.7)
POTASSIUM SERPL-SCNC: 4.2 MMOL/L (ref 3.5–5.3)
RBC # BLD AUTO: 4.27 MILLION/UL (ref 3.81–5.12)
SODIUM SERPL-SCNC: 137 MMOL/L (ref 135–147)
WBC # BLD AUTO: 9.01 THOUSAND/UL (ref 4.31–10.16)

## 2022-10-19 PROCEDURE — 80048 BASIC METABOLIC PNL TOTAL CA: CPT | Performed by: PHYSICIAN ASSISTANT

## 2022-10-19 PROCEDURE — 85025 COMPLETE CBC W/AUTO DIFF WBC: CPT | Performed by: PHYSICIAN ASSISTANT

## 2022-10-19 PROCEDURE — NC001 PR NO CHARGE: Performed by: SURGERY

## 2022-10-19 PROCEDURE — 99024 POSTOP FOLLOW-UP VISIT: CPT | Performed by: SURGERY

## 2022-10-19 RX ORDER — IBUPROFEN 200 MG
600 TABLET ORAL EVERY 6 HOURS PRN
Refills: 0
Start: 2022-10-22

## 2022-10-19 RX ORDER — ACETAMINOPHEN 325 MG/1
650 TABLET ORAL EVERY 6 HOURS PRN
Refills: 0
Start: 2022-10-19

## 2022-10-19 RX ADMIN — PREDNISOLONE ACETATE 1 DROP: 10 SUSPENSION/ DROPS OPHTHALMIC at 08:17

## 2022-10-19 RX ADMIN — FLUTICASONE FUROATE AND VILANTEROL TRIFENATATE 1 PUFF: 200; 25 POWDER RESPIRATORY (INHALATION) at 08:17

## 2022-10-19 RX ADMIN — LEVOTHYROXINE SODIUM 112 MCG: 112 TABLET ORAL at 06:10

## 2022-10-19 RX ADMIN — DORZOLAMIDE HYDROCHLORIDE AND TIMOLOL MALEATE 1 DROP: 20; 5 SOLUTION/ DROPS OPHTHALMIC at 08:17

## 2022-10-19 RX ADMIN — HEPARIN SODIUM 5000 UNITS: 5000 INJECTION INTRAVENOUS; SUBCUTANEOUS at 06:10

## 2022-10-19 NOTE — DISCHARGE INSTR - AVS FIRST PAGE
DISCHARGE INSTRUCTIONS    Follow Up: Follow up with Dr Jo Gottron on 10/31    Diet: You may resume a regular diet    Pain: Tylenol 650 mg every 6 hours as needed    Drains: Milk the ANDREW drain tubing each morning and each evening  Keep track of the 24 hour output from each drain (always record each time you empty the drains)  Once below 30 mL for two consecutive days, call Dr Jo Gottron' office to setup drain removal     Shower: You may shower, allowing soapy water to run over the incisions and drain sites  Do not scrub the areas  Pat dry  Do not bathe or use a pool or hot tub until cleared by the physician  Activity: As tolerated  You may go up and down stairs, walk as much as you are comfortable, but walk at least 3 times each day  Medications: Resume all of your previous medications, unless told otherwise by the doctor  Avoid aspirin or ibuprofen (Advil, Motrin, etc ) products for 2-3 days after the date of surgery  You may, at that time, begin taking them again  Tylenol is always fine  Call the office: If you are experiencing any of the following: fevers above 101 5° or chills, significant nausea or vomiting, increase in abdominal pain, if the wound develops drainage and/or has excessive redness surrounding it, or if you have significant diarrhea or other worsening symptoms

## 2022-10-19 NOTE — QUICK NOTE
Spoke with case mgt and no accepting nursing care facilities  are accepting the patient as of yet for drain care  Spoke with patient at this time and she has a mentally disabled son at home and can not care for the drains  She has macular degeneration and also can not care for the drains  We are waiting for case mgt to find an accepting service for her as an outpatient

## 2022-10-19 NOTE — PROGRESS NOTES
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, now status post bilateral neck dissection on 10/18  Afebrile, VSS  Morning labs pending    Urine:  300 cc recorded  ANDREW right:  30 cc serosanguineous  ANDREW left:  25 cc serosanguineous    Plan:  · Regular   · DC IV fluids   · Encourage p o  Intake  · ANDREW drain x2 (L and R neck); monitor output and character  · Pain and nausea control as needed   · DVT prophylaxis   · Incentive spirometry   · Encourage ambulation     Subjective/Objective     Subjective: Patient seen and examined at bedside, in no acute distress  No acute events overnight  Patient's pain is well controlled  Pt denies nausea or vomiting  Has not yet passed flatus  Objective:     Vitals:Blood pressure 165/70, pulse 64, temperature 97 5 °F (36 4 °C), temperature source Oral, resp  rate 14, height 4' 9" (1 448 m), weight 74 8 kg (165 lb), SpO2 95 %  ,Body mass index is 35 71 kg/m²  Temp (24hrs), Av 2 °F (36 8 °C), Min:97 3 °F (36 3 °C), Max:100 °F (37 8 °C)  Current: Temperature: 97 5 °F (36 4 °C)      Intake/Output Summary (Last 24 hours) at 10/19/2022 0607  Last data filed at 10/19/2022 0001  Gross per 24 hour   Intake 1757 5 ml   Output 355 ml   Net 1402 5 ml       Invasive Devices  Report    Peripheral Intravenous Line  Duration           Peripheral IV 10/18/22 Distal;Dorsal (posterior); Right Forearm <1 day    Peripheral IV 10/18/22 Right Forearm <1 day          Drain  Duration           Closed/Suction Drain Left Neck Bulb 10 Fr  <1 day    Closed/Suction Drain Right Neck Bulb 10 Fr  <1 day                Physical Exam:  General: No acute distress, alert and oriented  Neck:  Incision clean dry and intact, no hematoma, ANDREW drains in place as above, no hematomas, trachea midline  CV: Well perfused, regular rate and rhythm  Lungs: Normal work of breathing, no increased respiratory effort  Abdomen: Soft, non-tender, non-distended    Extremities: No edema, clubbing or cyanosis  Skin: Warm, dry  Neuro:  Cranial nerves grossly intact    Lab Results: Results: I have personally reviewed all pertinent laboratory/tests results  VTE Prophylaxis: Sequential compression device (Venodyne)  and Heparin    Niles Marroquin  10/19/2022

## 2022-10-19 NOTE — QUICK NOTE
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, now status post bilateral neck dissection on 10/18  Plan:  -regular diet   -IV fluids   -pain and nausea control as needed   -ANDREW drain x2 right and left next   -DVT prophylaxis   -incentive spirometry     Subjective: Pt feeling   Pain is well controlled  Denies fevers/chills, chest pain, sob       Objective:  Vitals:    10/18/22 2100   BP: 155/65   Pulse: 70   Resp: 12   Temp:    SpO2: 96%       I/O this shift:  In: 1200 [I V :1200]  Out: 330 [Urine:300; Drains:30]    Scheduled Meds:  Current Facility-Administered Medications   Medication Dose Route Frequency Provider Last Rate   • acetaminophen  650 mg Oral Q6H PRN Chayo Almaraz MD     • bimatoprost  1 drop Left Eye HS Chayo Almaraz MD     • calcium carbonate  500 mg Oral TID PRN Sivan Garner MD     • dorzolamide-timolol  1 drop Both Eyes BID Chayo Almaraz MD     • [START ON 10/19/2022] fluticasone-vilanterol  1 puff Inhalation Daily Chayo Almaraz MD     • heparin (porcine)  5,000 Units Subcutaneous [facility] Chayo Almaraz MD     • HYDROmorphone  0 5 mg Intravenous Q3H PRN Chayo Almaraz MD     • [START ON 10/19/2022] levothyroxine  112 mcg Oral Early Morning Chayo Almaraz MD     • multi-electrolyte  75 mL/hr Intravenous Continuous Chayo Almaraz MD 75 mL/hr (10/18/22 1955)   • ondansetron  4 mg Intravenous Q6H PRN Chayo Almaraz MD     • oxyCODONE  10 mg Oral Q4H PRN Chayo Almaraz MD     • oxyCODONE  5 mg Oral Q4H PRN Chayo Almaraz MD     • prednisoLONE acetate  1 drop Right Eye BID Chayo Almaraz MD     • simvastatin  20 mg Oral HS Chayo Almaraz MD       Continuous Infusions:multi-electrolyte, 75 mL/hr, Last Rate: 75 mL/hr (10/18/22 1955)      PRN Meds: •  acetaminophen  •  calcium carbonate  • HYDROmorphone  •  ondansetron  •  oxyCODONE  •  oxyCODONE      Physical Exam   Gen:  Well-developed, female  in NAD  Neck:  Incisions clean dry and intact ANDREW x2 in place with serosanguineous, no hematoma, trachea midline, cranial nerves intact  HEENT: EOMI  CV: RRR,   Lungs: Normal respiratory effort  Abd: soft, nontender, nondistended,  Neuro:  AxO x3      Akilah Li MD

## 2022-10-19 NOTE — UTILIZATION REVIEW
Initial Clinical Review    Elective Inpatient surgical procedure  Age/Sex: 81 y o  female  Surgery Date: 10/18/22  Procedure: BILATERAL NECK DISSECTION, FLEXIBLE LARYNGOSCOPY (Bilateral)  FLEXIBLE LARYNGOSCOPY  Anesthesia: general  Operative Findings: Right level 3 adenopathy; left level 4 adenopathy    POD#1 Progress Note: No acute events overnight  Patient's pain is well controlled  Pt denies nausea or vomiting  Has not yet passed flatus  DC IVF, encourage po intake, ANDREW drain x2 (L and R neck); monitor output and character  Pain and nausea control as needed  Incentive spirometry   Encourage ambulation     Admission Orders: Date/Time/Statement:   Admission Orders (From admission, onward)     Ordered        10/19/22 0739  Inpatient Admission  Once                      Orders Placed This Encounter   Procedures   • Inpatient Admission     Standing Status:   Standing     Number of Occurrences:   1     Order Specific Question:   Level of Care     Answer:   Med Surg [16]     Order Specific Question:   Estimated length of stay     Answer:   Inpatient Only Surgery     Vital Signs: /70 (BP Location: Right arm)   Pulse 64   Temp 97 5 °F (36 4 °C) (Oral)   Resp 14   Ht 4' 9" (1 448 m)   Wt 74 8 kg (165 lb)   SpO2 95%   BMI 35 71 kg/m²     Pertinent Labs/Diagnostic Test Results:       Results from last 7 days   Lab Units 10/19/22  0627   WBC Thousand/uL 9 01   HEMOGLOBIN g/dL 12 4   HEMATOCRIT % 39 4   PLATELETS Thousands/uL 263   NEUTROS ABS Thousands/µL 7 94*         Results from last 7 days   Lab Units 10/19/22  0627   SODIUM mmol/L 137   POTASSIUM mmol/L 4 2   CHLORIDE mmol/L 108   CO2 mmol/L 23   ANION GAP mmol/L 6   BUN mg/dL 17   CREATININE mg/dL 0 61   EGFR ml/min/1 73sq m 85   CALCIUM mg/dL 8 7     Results from last 7 days   Lab Units 10/19/22  0627   GLUCOSE RANDOM mg/dL 144*       Diet: regular  Mobility: OOB and ambulatory  DVT Prophylaxis: heparin, scd    Medications/Pain Control:   Scheduled Medications:  bimatoprost, 1 drop, Left Eye, HS  dorzolamide-timolol, 1 drop, Both Eyes, BID  fluticasone-vilanterol, 1 puff, Inhalation, Daily  heparin (porcine), 5,000 Units, Subcutaneous, Q8H AMANDA  levothyroxine, 112 mcg, Oral, Early Morning  prednisoLONE acetate, 1 drop, Right Eye, BID  simvastatin, 20 mg, Oral, HS      Continuous IV Infusions:  multi-electrolyte, 75 mL/hr, Intravenous, Continuous      PRN Meds:  acetaminophen, 650 mg, Oral, Q6H PRN  calcium carbonate, 500 mg, Oral, TID PRN  HYDROmorphone, 0 5 mg, Intravenous, Q3H PRN  ondansetron, 4 mg, Intravenous, Q6H PRN  oxyCODONE, 10 mg, Oral, Q4H PRN  oxyCODONE, 5 mg, Oral, Q4H PRN        Network Utilization Review Department  ATTENTION: Please call with any questions or concerns to [phone] and carefully listen to the prompts so that you are directed to the right person  All voicemails are confidential   Nina Mendez all requests for admission clinical reviews, approved or denied determinations and any other requests to dedicated fax number below belonging to the campus where the patient is receiving treatment   List of dedicated fax numbers for the Facilities:  [address] DENIALS (Administrative/Medical Necessity) [phone]   [address] (Maternity/NICU/Pediatrics) [phone]   [address] [phone]   [facility] [address] [phone]   [address] [phone]   [address] [facility] 815 [facility] [phone]

## 2022-10-19 NOTE — DISCHARGE SUMMARY
Discharge Summary - Surgical Oncology   Jb Blackwell 81 y o  female MRN: [id number]  Unit/Bed#: [facility] 319-01 Encounter: [id number]    Admission Date:   Admission Orders (From admission, onward)     Ordered        10/19/22 0739  Inpatient Admission  Once                        Admitting Diagnosis: Thyroid cancer ([address] ) Orlene Slocumb    HPI: Patient is an 80-year-old woman here for follow-up status post right cervical biopsy  Sumit Garcia has a known history of left cervical node metastasis from thyroid cancer  Procedures Performed: No orders of the defined types were placed in this encounter  Hospital Course: Ngozi Aguilar presented on 10/18/2022 for elective bilateral neck dissection  The procedure was without immediate complication  Postoperatively she was tolerating a regular diet, with adequate pain and nausea control  Two ANDREW drains were in place on the left and right side with serosanguineous drainage  On postoperative day 1 she was tolerating a regular diet, walking without issue, had adequate pain control, urinating, and having flatus  Prior to discharge she was provided ANDREW drain teaching, and a visiting nurse was set up  She was deemed appropriate for discharge home and will follow-up in 2 weeks with Dr Jo Gottron  Significant Findings, Care, Treatment and Services Provided: final pathology pending at time of discharge    Lab Results: I have personally reviewed pertinent lab results  , pathology pending    Complications: None    Discharge Diagnosis: thyroid cancer    Medical Problems             Resolved Problems  Date Reviewed: 10/18/2022   None                 Condition at Discharge: good         Discharge instructions/Information to patient and family:   See after visit summary for information provided to patient and family  Provisions for Follow-Up Care:  See after visit summary for information related to follow-up care and any pertinent home health orders        Disposition: See After Visit Summary for discharge disposition information  Planned Readmission: No    Discharge Statement   I spent 30 minutes discharging the patient  This time was spent on the day of discharge  I had direct contact with the patient on the day of discharge  Additional documentation is required if more than 30 minutes were spent on discharge  Discharge Medications:  See after visit summary for reconciled discharge medications provided to patient and family

## 2022-10-20 PROCEDURE — 88309 TISSUE EXAM BY PATHOLOGIST: CPT | Performed by: STUDENT IN AN ORGANIZED HEALTH CARE EDUCATION/TRAINING PROGRAM

## 2022-10-20 PROCEDURE — 88307 TISSUE EXAM BY PATHOLOGIST: CPT | Performed by: STUDENT IN AN ORGANIZED HEALTH CARE EDUCATION/TRAINING PROGRAM

## 2022-10-20 PROCEDURE — 88305 TISSUE EXAM BY PATHOLOGIST: CPT | Performed by: STUDENT IN AN ORGANIZED HEALTH CARE EDUCATION/TRAINING PROGRAM

## 2022-10-20 NOTE — CASE MANAGEMENT
Case Management Progress Note    Patient name Noemi Keller  Location [facility] 254-25 MRN [id number]  : 1940 Date 10/20/2022       LOS (days): 1  Geometric Mean LOS (GMLOS) (days):   Days to GMLOS:        OBJECTIVE:        Current admission status: Inpatient  Preferred Pharmacy:   [facility] [address] [address]  [address] [place] [address]  Phone: [phone] Fax: [address] [place] [address] [address] [facility] [address] [facility] [address] [address] [facility] 55 [address]  Phone: [phone] Fax: [phone]    Primary Care Provider: Rd Madsen DO    Primary Insurance: [employer] CROSS  [address] REP  Secondary Insurance:     PROGRESS NOTE:  Radha rich was the only accepting [facility] but after accepting they informed this CM that could not see pt until 10/21  TC to Allen Cantu at [place] this am to discuss same  Allen Cantu was able to make arrangements for VN to see pt today  TC to pt to inform her of same  She reported she is feeling well with Tylenol    [place] [facility] is aware

## 2022-10-20 NOTE — UTILIZATION REVIEW
NOTIFICATION OF ADMISSION DISCHARGE   This is a Notification of Discharge from [address] [place] [address]  Please be advised that this patient has been discharge from our facility  Below you will find the admission and discharge date and time including the patient’s disposition  UTILIZATION REVIEW CONTACT:  Charles Nathrop  Utilization   Network Utilization Review Department  Phone: [phone] x carefully listen to the prompts  All voicemails are confidential   Email: [email or web address]     ADMISSION INFORMATION  PRESENTATION DATE: 10/18/2022 11:25 AM  OBERVATION ADMISSION DATE:   INPATIENT ADMISSION DATE: 10/19/22  7:39 AM   DISCHARGE DATE: 10/19/2022  5:40 PM  DISPOSITION: Home with [place] with [address] INFORMATION:  Send all requests for admission clinical reviews, approved or denied determinations and any other requests to dedicated fax number below belonging to the campus where the patient is receiving treatment   List of dedicated fax numbers:  [address] DENIALS (Administrative/Medical Necessity) [phone]   [address] (Maternity/NICU/Pediatrics) [phone]   [facility] [phone]   [place] [address] [phone]   [address] [phone]   220 [facility] [phone]   90 [facility] [phone]   [address] [place] [address] [phone]   [facility]  [phone]   4058 [facility] [phone]   412 [facility] 850 [address] [facility] [phone]

## 2022-10-21 ENCOUNTER — TELEPHONE (OUTPATIENT)
Dept: HEMATOLOGY ONCOLOGY | Facility: CLINIC | Age: 82
End: 2022-10-21

## 2022-10-21 NOTE — TELEPHONE ENCOUNTER
CALL TRANSFER   Reason for patient call? Patient calling in indicating she was advised to make an appointment today 10/21 for removal of her drain bulb  Patient's primary physician? Dr Delmi Reaves    RN call was transferred to and time it was transferred? Michelle, 9:20am   Informed patient that the message will be forwarded to the team and someone will get back to them as soon as possible    Did you relay this information to the patient?   Yes

## 2022-10-24 ENCOUNTER — OFFICE VISIT (OUTPATIENT)
Dept: SURGICAL ONCOLOGY | Facility: CLINIC | Age: 82
End: 2022-10-24

## 2022-10-24 ENCOUNTER — TELEPHONE (OUTPATIENT)
Dept: ENDOCRINOLOGY | Facility: CLINIC | Age: 82
End: 2022-10-24

## 2022-10-24 VITALS
DIASTOLIC BLOOD PRESSURE: 82 MMHG | HEIGHT: 57 IN | OXYGEN SATURATION: 98 % | HEART RATE: 67 BPM | WEIGHT: 165 LBS | TEMPERATURE: 101.4 F | RESPIRATION RATE: 16 BRPM | SYSTOLIC BLOOD PRESSURE: 124 MMHG | BODY MASS INDEX: 35.6 KG/M2

## 2022-10-24 DIAGNOSIS — C73 THYROID CANCER (HCC): Primary | ICD-10-CM

## 2022-10-24 PROCEDURE — 99024 POSTOP FOLLOW-UP VISIT: CPT | Performed by: SURGERY

## 2022-10-24 NOTE — LETTER
October 24, 2022     DO Tarsha Rios [address] [facility] [place]    Patient: Derian Weldon   YOB: 1940   Date of Visit: 10/24/2022       Dear Dr Kaylyn Parish: Thank you for referring Liset Messina to me for evaluation  Below are my notes for this consultation  If you have questions, please do not hesitate to call me  I look forward to following your patient along with you  Sincerely,        Vern Sterling MD        CC: DO Vern Harris MD  10/24/2022  4:09 PM  Incomplete     Surgical Oncology Follow Up       [facility] [place]  [address] [facility] [address]  [address] [place] [address] [address]  1940  [id number]  [address] SURG[facility] David Leonoraas  [address] [facility] [address] [phone]    Chief Complaint   Patient presents with   • Follow-up       Assessment/Plan:    No problem-specific Assessment & Plan notes found for this encounter  Diagnoses and all orders for this visit:    Thyroid cancer ([facility] [address] )  -     US head neck lymph node mapping; Future  -     Thyroglobulin Panel; Future        Advance Care Planning/Advance Directives:  Discussed disease status, cancer treatment plans and/or cancer treatment goals with the patient  Oncology History   Thyroid cancer ([facility] [address] )   6/28/2021 Surgery    Slides reviewed by [facility] pathology:  1  Slides (6) labeled [id number] from [facility] (obtained on: 11/17/2016):   A  Thyroid, right, lobectomy:   -  Papillary thyroid carcinoma, tall cell variant, with necrosis (high grade papillary carcinoma),3 2 cm, unencapsulated,    - Background thyroid tissue with adenomatous nodule  B  Thyroid, left lobe, subtotal lobectomy:   - Thyroid tissue with adenomatous nodule  2  Slides (3) labeled  from [facility] (obtained on: 01/21/2020)   A   Lymph node, right neck, biopsy:   -  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; 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  8/8/2022 Biopsy    Neck, Soft Tissue Mass, Left neck Ultrasound-guided Needle Biopsy (3 slides [id number] [facility] collected 7/12/2022):  - Papillary thyroid carcinoma, focally invading fibrous tissue  8/26/2022 Surgery    Thyroid, Thyroid Right Lobectomy Subtotal Left Lobectomy Thyroid Tissue ( 6 slides [facility], collected 11/17/16):  A) Right thyroid lobectomy and sub total left lobectomy (6 slides):  - Papillary thyroid carcinoma, tall cell variant, with necrosis (high grade papillary carcinoma),3 2 cm, unencapsulated  - Surgical margins appear negative for carcinoma    - Background nodular adenomatous hyperplasia  B  Thyroid, :   - Nodular adenomatous hyperplasia of thyroid  9/6/2022 Biopsy    Lymph Node, Right, Zone 5A (ThinPrep and smear preparations):  Conclusive Evidence of Malignancy  Metastatic carcinoma  , consistent with Metastasis from the patient know papillary thyroid carcinoma          History of Present Illness: Patient here for postop check s/p bilateral neck dissection   -Interval History: No new issues since surgery  Drain outputs minimal     Review of Systems:  Review of Systems   Constitutional: Negative  HENT: Negative  Eyes: Negative  Respiratory: Negative  Cardiovascular: Negative  Gastrointestinal: Negative  Endocrine: Negative  Genitourinary: Negative  Musculoskeletal: Negative  Skin: Negative  Allergic/Immunologic: Negative  Neurological: Negative  Hematological: Negative  Psychiatric/Behavioral: Negative  All other systems reviewed and are negative        Patient Active Problem List   Diagnosis   • Postoperative hypothyroidism   • Thyroid cancer [facility])   • Encounter for geriatric assessment     Past Medical History:   Diagnosis Date   • Asthma    • Breast cancer ([address] )     Left   • Chronic pain disorder    • Dental crowns present    • Dry eyes    • Eczema    • Fibromyalgia, primary    • Generalized arthritis    • Glaucoma    • History of kidney stones    • History of radiation therapy    • [place] (hard of hearing)    • Hyperlipidemia    • Hypertension    • Limb alert care status     No BP-IV Left arm   • Macular degeneration    • Osteopenia    • Risk for falls    • Shortness of breath     per pt--"exertional and not new"   • Thyroid cancer ([address] )    • Uses walker     occas and occas cane   • Wears glasses      Past Surgical History:   Procedure Laterality Date   • BREAST LUMPECTOMY Left     2009   • CATARACT EXTRACTION Bilateral    • CHOLECYSTECTOMY     • LARYNGOSCOPY N/A 10/18/2022    Procedure: FLEXIBLE LARYNGOSCOPY;  Surgeon: Dexter Parisi MD;  [facility];  Service: Surgical Oncology   • RADICAL NECK DISSECTION Bilateral 10/18/2022    Procedure: BILATERAL NECK DISSECTION, FLEXIBLE LARYNGOSCOPY;  Surgeon: Dexter Parisi MD;  [facility];  Service: Surgical Oncology   • THYROIDECTOMY, PARTIAL  2016    "[address] [facility] [address]"   • US GUIDED LYMPH NODE BIOPSY LEFT  09/06/2022   • US GUIDED THYROID BIOPSY  11/07/2016     Family History   Problem Relation Age of Onset   • Heart disease Mother    • Heart disease Father    • Lupus Sister      Social History     Socioeconomic History   • Marital status:       Spouse name: Not on file   • Number of children: Not on file   • Years of education: Not on file   • Highest education level: Not on file   Occupational History   • Not on file   Tobacco Use   • Smoking status: Never Smoker   • Smokeless tobacco: Never Used   Substance and Sexual Activity   • Alcohol use: Never   • Drug use: Never   • Sexual activity: Not on file     Comment: defer   Other Topics Concern   • Not on file   Social History Narrative   • Not on file     Social Determinants of Health     Financial Resource Strain: Not on file   Food Insecurity: No Food Insecurity • Worried About [facility] in the Last Year: Never true   • Ran Out of Food in the Last Year: Never true   Transportation Needs: No Transportation Needs   • Lack of Transportation (Medical): No   • Lack of Transportation (Non-Medical): No   Physical Activity: Not on file   Stress: Not on file   Social Connections: Not on file   Intimate Partner Violence: Not on file   Housing Stability: Low Risk    • Unable to Pay for Housing in the Last Year: No   • Number of Places Lived in the Last Year: 1   • Unstable Housing in the Last Year: No       Current Outpatient Medications:   •  acetaminophen (TYLENOL) 325 mg tablet, Take 2 tablets (650 mg total) by mouth every 6 (six) hours as needed for mild pain, Disp: , Rfl: 0  •  CALCIUM PO, Take by mouth, Disp: , Rfl:   •  dorzolamide-timolol (COSOPT) 22 3-6 8 MG/ML ophthalmic solution, INSTILL 1 DROP LEFT EYE 2 TIMES A DAY, Disp: , Rfl:   •  fluticasone-salmeterol (ADVAIR HFA) 115-21 MCG/ACT inhaler, Inhale 2 puffs daily Rinse mouth after use , Disp: , Rfl:   •  GLUCOSAMINE-CHONDROITIN PO, Take by mouth, Disp: , Rfl:   •  ibuprofen (MOTRIN) 200 mg tablet, Take 3 tablets (600 mg total) by mouth every 6 (six) hours as needed for mild pain Per pt usually once a day will stop one week before surgery per surgeon Do not start before October 22, 2022 , Disp: , Rfl: 0  •  levothyroxine 112 mcg tablet, 1 tab daily 6 days of the week, 2 tabs on sundays   (Patient taking differently: 112 mcg daily), Disp: 102 tablet, Rfl: 3  •  lisinopril (ZESTRIL) 20 mg tablet, Take 20 mg by mouth daily, Disp: , Rfl:   •  LUMIGAN 0 01 % ophthalmic drops, INSTILL 1 DROP INTO LEFT EYE IN THE EVENING, Disp: , Rfl:   •  Multiple Vitamin (multivitamin) tablet, Take 1 tablet by mouth daily, Disp: , Rfl:   •  Omega-3 Fatty Acids (FISH OIL PO), Take by mouth, Disp: , Rfl:   •  omeprazole (PriLOSEC) 10 mg delayed release capsule, Take 10 mg by mouth daily, Disp: , Rfl:   •  prednisoLONE acetate (PRED FORTE) 1 % ophthalmic suspension, Administer 1 drop to the right eye 2 (two) times a day, Disp: , Rfl:   •  simvastatin (ZOCOR) 20 mg tablet, Take 20 mg by mouth daily at bedtime, Disp: , Rfl: 1  •  traMADol (Ultram) 50 mg tablet, Take 1 tablet (50 mg total) by mouth every 6 (six) hours as needed for moderate pain, Disp: 10 tablet, Rfl: 0  Allergies   Allergen Reactions   • Amoxicillin GI Intolerance     Nausea   • Erythromycin Nausea Only     Vitals:    10/24/22 1526   BP: 124/82   Pulse: 67   Resp: 16   Temp: (!) 101 4 °F (38 6 °C)   SpO2: 98%       Physical Exam  Vitals reviewed  Constitutional:       Appearance: Normal appearance  Neck:      Comments: Incision c/d/i; drain outputs min/serosanguinous  Musculoskeletal:      Cervical back: Normal range of motion and neck supple  Neurological:      Mental Status: She is alert  Results:  Labs:  Lab Results   Component Value Date    TSH 0 668 06/11/2020     Case Report   Surgical Pathology Report                         Case: [id number]                                    Authorizing Provider: Josh Cole MD        Collected:           10/18/2022 2830               Ordering Location:     [address]      Received:            10/18/2022 2146                                      Hospital Operating Room                                                       Pathologist:           Vicky Sherwood MD                                                          Specimens:   A) - Neck, right modified neck dissection-level 3 lymph nodes                                        B) - Soft Tissue, Other, right level 2-neck                                                          C) - Lymph Node, left level 5 lymph node                                                   Final Diagnosis   A   Right modified neck dissection-level 3 lymph nodes:      - Metastatic papillary thyroid carcinoma in one of five lymph nodes (1/5)      - Size or largest metastatic deposit: 2 8cm       - Extranodal extension: Present      B  Right level 2-neck:      - Three lymph nodes, negative for malignancy (0/3)     C  Left level 5 lymph node:      - Metastatic papillary thyroid carcinoma in one of two lymph nodes (1/2)      - Size or largest metastatic deposit: 3 5cm       - Extranodal extension: Present    Electronically signed by Asia Arauz MD on 10/20/2022 at 11:31 AM           Imaging  XR chest pa & lateral    Result Date: 10/4/2022  Narrative: CHEST INDICATION:   C73: Malignant neoplasm of thyroid gland  COMPARISON:  None EXAM PERFORMED/VIEWS:  XR CHEST PA & LATERAL FINDINGS: Cardiomediastinal silhouette appears unremarkable  Mild aortic arch calcifications noted  The lungs are clear  No pneumothorax or pleural effusion  Osseous structures appear within normal limits for patient age  No free air in the upper abdomen  Impression: No acute cardiopulmonary disease  Workstation performed: [id number]     I reviewed the above laboratory and imaging data  Discussion/Summary: Metastatic thyroid ca, s/p bilateral neck dissection  Drains removed  F/u with endocinology to discuss poss MONTEZ, given metastases  Otherwise f/u in 6 months with surveillance US and bloodwork

## 2022-10-24 NOTE — TELEPHONE ENCOUNTER
Received consult note from Dr Kayla Reyes  Is patient following with another endocrinologist or does she need to arrange follow up with us?

## 2022-10-24 NOTE — PROGRESS NOTES
Surgical Oncology Follow Up       [address] [facility] [place]  [address]  [place] [address]  [address] [place] [address] [address]  1940  [id number]  [address] [facility] CANCER [facility] SURGICAL ONCOLOGY Claryclindsay Dietrich  [address]  [address] [facility] [id number] [phone]    Chief Complaint   Patient presents with   • Follow-up       Assessment/Plan:    No problem-specific Assessment & Plan notes found for this encounter  Diagnoses and all orders for this visit:    Thyroid cancer ([facility] [address] )  -     US head neck lymph node mapping; Future  -     Thyroglobulin Panel; Future        Advance Care Planning/Advance Directives:  Discussed disease status, cancer treatment plans and/or cancer treatment goals with the patient  Oncology History   Thyroid cancer ([facility] [address] )   6/28/2021 Surgery    Slides reviewed by [facility] pathology:  1  Slides (6) labeled [id number] from [facility] (obtained on: 11/17/2016):   A  Thyroid, right, lobectomy:   -  Papillary thyroid carcinoma, tall cell variant, with necrosis (high grade papillary carcinoma),3 2 cm, unencapsulated,    - Background thyroid tissue with adenomatous nodule  B  Thyroid, left lobe, subtotal lobectomy:   - Thyroid tissue with adenomatous nodule  2  Slides (3) labeled  from [facility] (obtained on: 01/21/2020)   A  Lymph node, right neck, biopsy:   -  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; 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  8/8/2022 Biopsy    Neck, Soft Tissue Mass, Left neck Ultrasound-guided Needle Biopsy (3 slides [id number] [facility], collected 7/12/2022):  - Papillary thyroid carcinoma, focally invading fibrous tissue           8/26/2022 Surgery    Thyroid, Thyroid Right Lobectomy Subtotal Left Lobectomy Thyroid Tissue ( 6 slides [facility], collected 11/17/16):  A) Right thyroid lobectomy and sub total left lobectomy (6 slides):  - Papillary thyroid carcinoma, tall cell variant, with necrosis (high grade papillary carcinoma),3 2 cm, unencapsulated  - Surgical margins appear negative for carcinoma    - Background nodular adenomatous hyperplasia  B  Thyroid, :   - Nodular adenomatous hyperplasia of thyroid  9/6/2022 Biopsy    Lymph Node, Right, Zone 5A (ThinPrep and smear preparations):  Conclusive Evidence of Malignancy  Metastatic carcinoma  , consistent with Metastasis from the patient know papillary thyroid carcinoma          History of Present Illness: Patient here for postop check s/p bilateral neck dissection   -Interval History: No new issues since surgery  Drain outputs minimal     Review of Systems:  Review of Systems   Constitutional: Negative  HENT: Negative  Eyes: Negative  Respiratory: Negative  Cardiovascular: Negative  Gastrointestinal: Negative  Endocrine: Negative  Genitourinary: Negative  Musculoskeletal: Negative  Skin: Negative  Allergic/Immunologic: Negative  Neurological: Negative  Hematological: Negative  Psychiatric/Behavioral: Negative  All other systems reviewed and are negative        Patient Active Problem List   Diagnosis   • Postoperative hypothyroidism   • Thyroid cancer ([facility] [address] )   • Encounter for geriatric assessment     Past Medical History:   Diagnosis Date   • Asthma    • Breast cancer ([address] )     Left   • Chronic pain disorder    • Dental crowns present    • Dry eyes    • Eczema    • Fibromyalgia, primary    • Generalized arthritis    • Glaucoma    • History of kidney stones    • History of radiation therapy    • [place] (hard of hearing)    • Hyperlipidemia    • Hypertension    • Limb alert care status     No BP-IV Left arm   • Macular degeneration    • Osteopenia    • Risk for falls    • Shortness of breath     per pt--"exertional and not new"   • Thyroid cancer ([address] )    • Uses walker     occas and occas cane   • Wears glasses      Past Surgical History:   Procedure Laterality Date   • BREAST LUMPECTOMY Left     2009   • CATARACT EXTRACTION Bilateral    • CHOLECYSTECTOMY     • LARYNGOSCOPY N/A 10/18/2022    Procedure: Kassandra Sotomayor;  Surgeon: Juanito Ashraf MD;  [facility];  Service: Surgical Oncology   • RADICAL NECK DISSECTION Bilateral 10/18/2022    Procedure: BILATERAL NECK DISSECTION, FLEXIBLE LARYNGOSCOPY;  Surgeon: Juanito Ashraf MD;  [facility];  Service: Surgical Oncology   • THYROIDECTOMY, PARTIAL  2016    "[address] [facility] [address]"   • US GUIDED LYMPH NODE BIOPSY LEFT  09/06/2022   • US GUIDED THYROID BIOPSY  11/07/2016     Family History   Problem Relation Age of Onset   • Heart disease Mother    • Heart disease Father    • Lupus Sister      Social History     Socioeconomic History   • Marital status:      Spouse name: Not on file   • Number of children: Not on file   • Years of education: Not on file   • Highest education level: Not on file   Occupational History   • Not on file   Tobacco Use   • Smoking status: Never Smoker   • Smokeless tobacco: Never Used   Substance and Sexual Activity   • Alcohol use: Never   • Drug use: Never   • Sexual activity: Not on file     Comment: defer   Other Topics Concern   • Not on file   Social History Narrative   • Not on file     Social Determinants of Health     Financial Resource Strain: Not on file   Food Insecurity: No Food Insecurity   • Worried About Running Out of Food in the Last Year: Never true   • Ran Out of Food in the Last Year: Never true   Transportation Needs: No Transportation Needs   • Lack of Transportation (Medical): No   • Lack of Transportation (Non-Medical):  No   Physical Activity: Not on file   Stress: Not on file   Social Connections: Not on file   Intimate Partner Violence: Not on file   Housing Stability: Low Risk    • Unable to Pay for Housing in the Last Year: No   • Number of Places Lived in the Last Year: 1   • Unstable Housing in the Last Year: No       Current Outpatient Medications:   •  acetaminophen (TYLENOL) 325 mg tablet, Take 2 tablets (650 mg total) by mouth every 6 (six) hours as needed for mild pain, Disp: , Rfl: 0  •  CALCIUM PO, Take by mouth, Disp: , Rfl:   •  dorzolamide-timolol (COSOPT) 22 3-6 8 MG/ML ophthalmic solution, INSTILL 1 DROP LEFT EYE 2 TIMES A DAY, Disp: , Rfl:   •  fluticasone-salmeterol (ADVAIR HFA) 115-21 MCG/ACT inhaler, Inhale 2 puffs daily Rinse mouth after use , Disp: , Rfl:   •  GLUCOSAMINE-CHONDROITIN PO, Take by mouth, Disp: , Rfl:   •  ibuprofen (MOTRIN) 200 mg tablet, Take 3 tablets (600 mg total) by mouth every 6 (six) hours as needed for mild pain Per pt usually once a day will stop one week before surgery per surgeon Do not start before October 22, 2022 , Disp: , Rfl: 0  •  levothyroxine 112 mcg tablet, 1 tab daily 6 days of the week, 2 tabs on sundays   (Patient taking differently: 112 mcg daily), Disp: 102 tablet, Rfl: 3  •  lisinopril (ZESTRIL) 20 mg tablet, Take 20 mg by mouth daily, Disp: , Rfl:   •  LUMIGAN 0 01 % ophthalmic drops, INSTILL 1 DROP INTO LEFT EYE IN THE EVENING, Disp: , Rfl:   •  Multiple Vitamin (multivitamin) tablet, Take 1 tablet by mouth daily, Disp: , Rfl:   •  Omega-3 Fatty Acids (FISH OIL PO), Take by mouth, Disp: , Rfl:   •  omeprazole (PriLOSEC) 10 mg delayed release capsule, Take 10 mg by mouth daily, Disp: , Rfl:   •  prednisoLONE acetate (PRED FORTE) 1 % ophthalmic suspension, Administer 1 drop to the right eye 2 (two) times a day, Disp: , Rfl:   •  simvastatin (ZOCOR) 20 mg tablet, Take 20 mg by mouth daily at bedtime, Disp: , Rfl: 1  •  traMADol (Ultram) 50 mg tablet, Take 1 tablet (50 mg total) by mouth every 6 (six) hours as needed for moderate pain, Disp: 10 tablet, Rfl: 0  Allergies   Allergen Reactions   • Amoxicillin GI Intolerance     Nausea   • Erythromycin Nausea Only     Vitals:    10/24/22 1526   BP: 124/82   Pulse: 67   Resp: 16   Temp: (!) 101 4 °F (38 6 °C) SpO2: 98%       Physical Exam  Vitals reviewed  Constitutional:       Appearance: Normal appearance  Neck:      Comments: Incision c/d/i; drain outputs min/serosanguinous  Musculoskeletal:      Cervical back: Normal range of motion and neck supple  Neurological:      Mental Status: She is alert  Results:  Labs:  Lab Results   Component Value Date    TSH 0 668 06/11/2020     Case Report   Surgical Pathology Report                         Case: [id number]                                    Authorizing Provider: Shannon Alanis MD        Collected:           10/18/2022 6542               Ordering Location:     [address]      Received:            10/18/2022 2146                                      Hospital Operating Room                                                       Pathologist:           Vicky Escalante MD                                                          Specimens:   A) - Neck, right modified neck dissection-level 3 lymph nodes                                        B) - Soft Tissue, Other, right level 2-neck                                                          C) - Lymph Node, left level 5 lymph node                                                   Final Diagnosis   A  Right modified neck dissection-level 3 lymph nodes:      - Metastatic papillary thyroid carcinoma in one of five lymph nodes (1/5)      - Size or largest metastatic deposit: 2 8cm       - Extranodal extension: Present      B  Right level 2-neck:      - Three lymph nodes, negative for malignancy (0/3)     C  Left level 5 lymph node:      - Metastatic papillary thyroid carcinoma in one of two lymph nodes (1/2)      - Size or largest metastatic deposit: 3 5cm       - Extranodal extension: Present    Electronically signed by Caroline Price MD on 10/20/2022 at 11:31 AM           Imaging  XR chest pa & lateral    Result Date: 10/4/2022  Narrative: CHEST INDICATION:   C73:  Malignant neoplasm of thyroid gland  COMPARISON:  None EXAM PERFORMED/VIEWS:  XR CHEST PA & LATERAL FINDINGS: Cardiomediastinal silhouette appears unremarkable  Mild aortic arch calcifications noted  The lungs are clear  No pneumothorax or pleural effusion  Osseous structures appear within normal limits for patient age  No free air in the upper abdomen  Impression: No acute cardiopulmonary disease  Workstation performed: [id number]     I reviewed the above laboratory and imaging data  Discussion/Summary: Metastatic thyroid ca, s/p bilateral neck dissection  Drains removed  F/u with endocinology to discuss poss MONTEZ, given metastases  Otherwise f/u in 6 months with surveillance US and bloodwork

## 2022-11-09 ENCOUNTER — TELEPHONE (OUTPATIENT)
Dept: HEMATOLOGY ONCOLOGY | Facility: CLINIC | Age: 82
End: 2022-11-09

## 2022-11-09 NOTE — TELEPHONE ENCOUNTER
Spoke to facility and let them know that Dr Marlyn Lopez did not order the home health care and that the drains have already been pooled  Dr Marlyn Lopez is not going to do a peer to peer concerning this  She verbalized understanding and states that they will reach out to the ordering physician

## 2022-11-09 NOTE — TELEPHONE ENCOUNTER
CALL TRANSFER   Reason for patient call? Aria Field from [facility] calling regarding orders for home health    Patient's primary physician? Dr Roula Olivo     RN call was transferred to and time it was transferred? Tracy Sumner @ 8:36AM   Informed patient that the message will be forwarded to the team and someone will get back to them as soon as possible    Did you relay this information to the patient?  Yes

## 2022-11-10 ENCOUNTER — TELEPHONE (OUTPATIENT)
Dept: HEMATOLOGY ONCOLOGY | Facility: CLINIC | Age: 82
End: 2022-11-10

## 2022-11-10 ENCOUNTER — TELEPHONE (OUTPATIENT)
Dept: SURGICAL ONCOLOGY | Facility: CLINIC | Age: 82
End: 2022-11-10

## 2022-11-10 NOTE — TELEPHONE ENCOUNTER
CALL TRANSFER   Reason for patient call? Patient calling in requesting her Endo provider Dr Stefany Power, contact information  Patient's primary physician? Dr Tessa Santillan    RN call was transferred to and time it was transferred? Dr Stefany Power, 2:17pm   [phone]   Informed patient that the message will be forwarded to the team and someone will get back to them as soon as possible    Did you relay this information to the patient?   Yes

## 2022-11-10 NOTE — TELEPHONE ENCOUNTER
Patient called in to report that [facility] does not do the radioactive iodine  She had seen Dr Javier Saunders when he was in [facility], but when she called the office, they told her that they have no record of her  Patient would like a referral to Dr Javier Saunders  I agreed to discuss with Dr Baldo Wright and get back to her tomorrow  Patient verbalized understanding and thanks

## 2022-11-11 ENCOUNTER — TELEPHONE (OUTPATIENT)
Dept: SURGICAL ONCOLOGY | Facility: CLINIC | Age: 82
End: 2022-11-11

## 2022-11-11 DIAGNOSIS — C73 THYROID CANCER (HCC): Primary | ICD-10-CM

## 2022-11-15 ENCOUNTER — OFFICE VISIT (OUTPATIENT)
Dept: ENDOCRINOLOGY | Facility: CLINIC | Age: 82
End: 2022-11-15

## 2022-11-15 VITALS
OXYGEN SATURATION: 100 % | HEIGHT: 57 IN | HEART RATE: 77 BPM | SYSTOLIC BLOOD PRESSURE: 130 MMHG | BODY MASS INDEX: 34.82 KG/M2 | DIASTOLIC BLOOD PRESSURE: 70 MMHG | WEIGHT: 161.4 LBS

## 2022-11-15 DIAGNOSIS — C73 PAPILLARY CARCINOMA OF THYROID (HCC): ICD-10-CM

## 2022-11-15 DIAGNOSIS — C73 THYROID CANCER (HCC): Primary | ICD-10-CM

## 2022-11-15 DIAGNOSIS — E89.0 POSTOPERATIVE HYPOTHYROIDISM: ICD-10-CM

## 2022-11-15 RX ORDER — LEVOTHYROXINE SODIUM 112 UG/1
112 TABLET ORAL DAILY
Qty: 90 TABLET | Refills: 3 | Status: SHIPPED | OUTPATIENT
Start: 2022-11-15

## 2022-11-15 NOTE — PROGRESS NOTES
Established Patient Progress Note    CC: papillary thyroid cancer followup    History of Present Illness:   78 yo F with hx of papillary thyroid cancer and post op hypothyroidism presenting for followup with endocrinology  She had undergone subtotal thyroidectomy in 11/2016 at [facility] showing 3 2cm papillary thyroid cancer in R lobe, and subtotal left lobectomy  Initial pathology was read to be classic variant papillary thyroid carcinoma, she did not receive radioactive iodine at that time  In 2020, was noted on Pet scan to have metastatic lymph nodes but pt declined surgical referral and was then referred to radiation oncology  Decision was made at that time to monitor conservatively without treatment with MONTEZ given concerns with isolation relating to radioactive iodine  More recently she was seen by Dr Pricilla Sy of endocrinology [facility] and per chart review she was seen by [facility] endocrinology by telemedicine visit in 2021  Slides were reviewed by [facility] pathology as papillary thyroid carcinoma, tall cell variant, with necrosis, unencapsulated  She noted a left sided neck mass earlier this year in 2022 and established care with surgical oncology Dr Monse Stark who performed a bilateral neck dissection on 10/18/2022 and she was found to have metastatic papillary thyroid cancer in one level 3 right sided lymph node and one level 5 left sided lymph node  She presents today to discuss radioactive iodine ablation  She has concerns about MONTEZ including sharing a bathroom, sharing a kitchen, managing the medications of her son with cognitive impairment  She reports she has been taking 112mcg levothyroxine daily for about 1 year, this was reduced by Dr Pricilla Sy  She reports some neck pain around the site of the incision on the R and along the upper chest on the L that she states are improved after the surgery compared to before   She reports prior difficulty swallowing and raspiness of voice which have now improved  Patient Active Problem List   Diagnosis   • Postoperative hypothyroidism   • Papillary carcinoma of thyroid ([facility] [address] )   • Encounter for geriatric assessment     Past Medical History:   Diagnosis Date   • Asthma    • Breast cancer ([facility] [address] )     Left   • Chronic pain disorder    • Dental crowns present    • Dry eyes    • Eczema    • Fibromyalgia, primary    • Generalized arthritis    • Glaucoma    • History of kidney stones    • History of radiation therapy    • [place] (hard of hearing)    • Hyperlipidemia    • Hypertension    • Limb alert care status     No BP-IV Left arm   • Macular degeneration    • Osteopenia    • Risk for falls    • Shortness of breath     per pt--"exertional and not new"   • Thyroid cancer ([facility] [address] )    • Uses walker     occas and occas cane   • Wears glasses       Past Surgical History:   Procedure Laterality Date   • BREAST LUMPECTOMY Left     2009   • CATARACT EXTRACTION Bilateral    • CHOLECYSTECTOMY     • LARYNGOSCOPY N/A 10/18/2022    Procedure: FLEXIBLE LARYNGOSCOPY;  Surgeon: Landon Garber MD;  [facility];  Service: Surgical Oncology   • RADICAL NECK DISSECTION Bilateral 10/18/2022    Procedure: BILATERAL NECK DISSECTION, FLEXIBLE LARYNGOSCOPY;  Surgeon: Landon Garber MD;  [facility];  Service: Surgical Oncology   • THYROIDECTOMY, PARTIAL  2016    "[address]"   • US GUIDED LYMPH NODE BIOPSY LEFT  09/06/2022   • US GUIDED THYROID BIOPSY  11/07/2016      Family History   Problem Relation Age of Onset   • Heart disease Mother    • Heart disease Father    • Lupus Sister      Social History     Tobacco Use   • Smoking status: Never Smoker   • Smokeless tobacco: Never Used   Substance Use Topics   • Alcohol use: Never     Allergies   Allergen Reactions   • Amoxicillin GI Intolerance     Nausea   • Erythromycin Nausea Only         Review of Systems   Constitutional: Negative for chills, fatigue and fever     HENT: Negative for rhinorrhea and sore throat  Respiratory: Negative for choking, chest tightness, shortness of breath, wheezing and stridor  Cardiovascular: Negative for chest pain, palpitations and leg swelling  Gastrointestinal: Negative for abdominal pain, blood in stool, constipation, diarrhea, nausea and vomiting  Genitourinary: Negative for hematuria  Musculoskeletal: Positive for neck pain  Neurological: Negative for dizziness and light-headedness  All other systems reviewed and are negative          Current Outpatient Medications:   •  CALCIUM PO, Take by mouth, Disp: , Rfl:   •  dorzolamide-timolol (COSOPT) 22 3-6 8 MG/ML ophthalmic solution, INSTILL 1 DROP LEFT EYE 2 TIMES A DAY, Disp: , Rfl:   •  fluticasone-salmeterol (ADVAIR HFA) 115-21 MCG/ACT inhaler, Inhale 2 puffs daily Rinse mouth after use , Disp: , Rfl:   •  GLUCOSAMINE-CHONDROITIN PO, Take by mouth in the morning, Disp: , Rfl:   •  ibuprofen (MOTRIN) 200 mg tablet, Take 3 tablets (600 mg total) by mouth every 6 (six) hours as needed for mild pain Per pt usually once a day will stop one week before surgery per surgeon Do not start before October 22, 2022 , Disp: , Rfl: 0  •  levothyroxine (Euthyrox) 112 mcg tablet, Take 1 tablet (112 mcg total) by mouth daily, Disp: 90 tablet, Rfl: 3  •  lisinopril (ZESTRIL) 20 mg tablet, Take 20 mg by mouth daily, Disp: , Rfl:   •  LUMIGAN 0 01 % ophthalmic drops, INSTILL 1 DROP INTO LEFT EYE IN THE EVENING, Disp: , Rfl:   •  Multiple Vitamin (multivitamin) tablet, Take 1 tablet by mouth daily, Disp: , Rfl:   •  Omega-3 Fatty Acids (FISH OIL PO), Take by mouth, Disp: , Rfl:   •  omeprazole (PriLOSEC) 10 mg delayed release capsule, Take 10 mg by mouth daily, Disp: , Rfl:   •  prednisoLONE acetate (PRED FORTE) 1 % ophthalmic suspension, Administer 1 drop to the right eye 2 (two) times a day, Disp: , Rfl:   •  simvastatin (ZOCOR) 20 mg tablet, Take 20 mg by mouth daily at bedtime, Disp: , Rfl: 1  •  traMADol (Ultram) 50 mg tablet, Take 1 tablet (50 mg total) by mouth every 6 (six) hours as needed for moderate pain, Disp: 10 tablet, Rfl: 0  •  acetaminophen (TYLENOL) 325 mg tablet, Take 2 tablets (650 mg total) by mouth every 6 (six) hours as needed for mild pain (Patient not taking: Reported on 11/15/2022), Disp: , Rfl: 0    Physical Exam:  Body mass index is 34 93 kg/m²  /70   Pulse 77   Ht 4' 9" (1 448 m)   Wt 73 2 kg (161 lb 6 4 oz)   SpO2 100%   BMI 34 93 kg/m²        Physical Exam  Vitals reviewed  Constitutional:       Appearance: She is well-developed  HENT:      Head: Normocephalic and atraumatic  Eyes:      General:         Right eye: No discharge  Left eye: No discharge  Neck:      Comments: Bilateral neck incisions noted, healing  Cardiovascular:      Rate and Rhythm: Normal rate and regular rhythm  Heart sounds: Normal heart sounds  No murmur heard  No friction rub  No gallop  Pulmonary:      Effort: Pulmonary effort is normal  No respiratory distress  Breath sounds: Normal breath sounds  No wheezing or rales  Chest:      Chest wall: No tenderness  Abdominal:      General: Bowel sounds are normal  There is no distension  Palpations: Abdomen is soft  There is no mass  Tenderness: There is no abdominal tenderness  Musculoskeletal:         General: Normal range of motion  Cervical back: Normal range of motion and neck supple  Skin:     General: Skin is warm and dry  Neurological:      Mental Status: She is alert and oriented to person, place, and time     Psychiatric:         Behavior: Behavior normal           Labs:     Lab Results   Component Value Date    TSH 0 668 06/11/2020       Lab Results   Component Value Date    CREATININE 0 61 10/19/2022    CREATININE 0 71 10/03/2022    CREATININE 0 69 08/30/2022    BUN 17 10/19/2022    K 4 2 10/19/2022     10/19/2022    CO2 23 10/19/2022     eGFR   Date Value Ref Range Status   10/19/2022 85 ml/min/1 73sq m Final       Lab Results   Component Value Date    ALT 15 10/03/2022    AST 13 10/03/2022    ALKPHOS 89 10/03/2022       No results found for: CHOLESTEROL  No results found for: HDL  No results found for: TRIG  No results found for: NONHDLC      Impression:  1  Thyroid cancer ([place] [address] )    2  Postoperative hypothyroidism    3  Papillary carcinoma of thyroid ([place] [address] )         Plan:    Diagnoses and all orders for this visit:    Thyroid cancer ([place] [address] ), Papillary carcinoma of thyroid ([place] [address] )  -     TSH, 3rd generation Lab Collect; Future  -     T4, free Lab Collect; Future  -     Thyroglobulin w/ab Lab Collect; Future  -     NM consultation thyroid cancer treatment; Future  -     levothyroxine (Euthyrox) 112 mcg tablet; Take 1 tablet (112 mcg total) by mouth daily  Referral to nuclear medicine placed for pt to have consult regarding logistics of radioactive iodine  She has a number of concerns including sharing a bathroom which she will discuss during consultation  Recheck thyroid function tests and thyroglobulin  Continue 112mcg levothyroxine for now  TSH goal will be 0 1-0 5 given advanced age  Postoperative hypothyroidism  -     TSH, 3rd generation Lab Collect; Future  -     T4, free Lab Collect; Future  -     Thyroglobulin w/ab Lab Collect; Future  -     NM consultation thyroid cancer treatment; Future  -     levothyroxine (Euthyrox) 112 mcg tablet; Take 1 tablet (112 mcg total) by mouth daily  As above - Recheck thyroid function tests  Continue 112mcg levothyroxine for now  TSH goal will be 0 1-0 5 given advanced age  I have spent 40 minutes with patient today in which greater than 50% of this time was spent in counseling/coordination of care  All questioned fully answered  She will call me if any problems arise  Educated/ Counseled patient on diagnostic test results, prognosis, risk vs benefit of treatment options, importance of treatment compliance, healthy life and lifestyle choices

## 2022-11-30 ENCOUNTER — HOSPITAL ENCOUNTER (OUTPATIENT)
Dept: NUCLEAR MEDICINE | Facility: HOSPITAL | Age: 82
Discharge: HOME | End: 2022-11-30

## 2022-11-30 ENCOUNTER — LAB (OUTPATIENT)
Dept: LAB | Facility: HOSPITAL | Age: 82
End: 2022-11-30

## 2022-11-30 DIAGNOSIS — C73 THYROID CANCER (HCC): ICD-10-CM

## 2022-11-30 DIAGNOSIS — E89.0 POSTOPERATIVE HYPOTHYROIDISM: ICD-10-CM

## 2022-11-30 LAB
T4 FREE SERPL-MCNC: 1.31 NG/DL (ref 0.76–1.46)
TSH SERPL DL<=0.05 MIU/L-ACNC: 0.51 UIU/ML (ref 0.45–4.5)

## 2022-12-01 ENCOUNTER — TELEPHONE (OUTPATIENT)
Dept: ENDOCRINOLOGY | Facility: HOSPITAL | Age: 82
End: 2022-12-01

## 2022-12-01 LAB
THYROGLOB AB SERPL-ACNC: <1 IU/ML (ref 0–0.9)
THYROGLOB SERPL-MCNC: 0.2 NG/ML (ref 1.5–38.5)

## 2023-01-25 ENCOUNTER — HOSPITAL ENCOUNTER (OUTPATIENT)
Dept: NUCLEAR MEDICINE | Facility: HOSPITAL | Age: 83
Discharge: HOME | End: 2023-01-25

## 2023-01-25 DIAGNOSIS — C73 MALIGNANT NEOPLASM OF THYROID GLAND (HCC): ICD-10-CM

## 2023-01-25 RX ADMIN — THYROTROPIN ALFA 0.9 MG: 0.9 INJECTION, POWDER, FOR SOLUTION INTRAMUSCULAR at 08:35

## 2023-01-25 NOTE — NURSING NOTE
Patient arrived for day one thyrogen injection today  Pt denies any current illness  Post menopausal  Thyrogen injection procedure and possible side effects reviewed with patient, she verbalizes understanding  See MAR for injection details  Pt tolerated well, R buttock site clean and dry  Reviewed testing schedule with patient, she will return tomorrow for second injection and capsule admin

## 2023-01-26 ENCOUNTER — HOSPITAL ENCOUNTER (OUTPATIENT)
Dept: NUCLEAR MEDICINE | Facility: HOSPITAL | Age: 83
Discharge: HOME | End: 2023-01-26

## 2023-01-26 RX ADMIN — THYROTROPIN ALFA 0.9 MG: 0.9 INJECTION, POWDER, FOR SOLUTION INTRAMUSCULAR at 08:44

## 2023-01-26 NOTE — PROGRESS NOTES
Patient arrived for day two thyrogen injection today  Pt feels she may have had a reaction to the first injection, reports abdominal pain, nausea, one episode of vomiting  Dr Joanna Velazquez notified and encourages second dose  Patient agreeable to second dose and understands she may have the same reaction  Post menopausal  Thyrogen injection procedure and possible side effects reviewed with patient, she verbalizes understanding  See MAR for injection details   Pt tolerated well, L buttock site clean and dry

## 2023-01-27 ENCOUNTER — HOSPITAL ENCOUNTER (OUTPATIENT)
Dept: RADIOLOGY | Age: 83
Discharge: HOME | End: 2023-01-27

## 2023-01-27 ENCOUNTER — APPOINTMENT (OUTPATIENT)
Dept: LAB | Age: 83
End: 2023-01-27

## 2023-01-27 ENCOUNTER — HOSPITAL ENCOUNTER (OUTPATIENT)
Dept: NUCLEAR MEDICINE | Facility: HOSPITAL | Age: 83
Discharge: HOME | End: 2023-01-27

## 2023-01-27 DIAGNOSIS — C73 THYROID CANCER (HCC): Primary | ICD-10-CM

## 2023-01-27 DIAGNOSIS — C73 THYROID CANCER (HCC): ICD-10-CM

## 2023-01-27 DIAGNOSIS — C73 MALIGNANT NEOPLASM OF THYROID GLAND (HCC): ICD-10-CM

## 2023-01-31 LAB
THYROGLOB AB SERPL-ACNC: <1 IU/ML (ref 0–0.9)
THYROGLOB SERPL-MCNC: 2.7 NG/ML (ref 1.5–38.5)

## 2023-02-02 ENCOUNTER — HOSPITAL ENCOUNTER (OUTPATIENT)
Dept: NUCLEAR MEDICINE | Facility: HOSPITAL | Age: 83
Discharge: HOME | End: 2023-02-02

## 2023-02-02 DIAGNOSIS — C73 MALIGNANT NEOPLASM OF THYROID GLAND (HCC): ICD-10-CM

## 2023-02-03 DIAGNOSIS — E89.0 POSTOPERATIVE HYPOTHYROIDISM: ICD-10-CM

## 2023-02-03 DIAGNOSIS — C73 THYROID CANCER (HCC): Primary | ICD-10-CM

## 2023-02-23 ENCOUNTER — DOCUMENTATION (OUTPATIENT)
Dept: HEMATOLOGY ONCOLOGY | Facility: CLINIC | Age: 83
End: 2023-02-23

## 2023-02-23 NOTE — PROGRESS NOTES
Pt called me & asked sd that she is getting bills & she has f/a looked on the acct & pt was approved for 100% but there are no dates of the assistance  Emailed luc riley to find out the date that the pt was approved for   Once I get that info I will call the pt back

## 2023-04-24 ENCOUNTER — HOSPITAL ENCOUNTER (OUTPATIENT)
Dept: ULTRASOUND IMAGING | Facility: HOSPITAL | Age: 83
Discharge: HOME | End: 2023-04-24
Attending: SURGERY

## 2023-04-24 DIAGNOSIS — C73 THYROID CANCER (HCC): ICD-10-CM

## 2023-04-28 ENCOUNTER — TELEPHONE (OUTPATIENT)
Dept: SURGICAL ONCOLOGY | Facility: CLINIC | Age: 83
End: 2023-04-28

## 2023-04-28 ENCOUNTER — TELEPHONE (OUTPATIENT)
Dept: HEMATOLOGY ONCOLOGY | Facility: CLINIC | Age: 83
End: 2023-04-28

## 2023-04-28 NOTE — TELEPHONE ENCOUNTER
"Patient Call    Who are you speaking with? Patient    If it is not the patient, are they listed on an active communication consent form? N/A   What is the reason for this call? Patient would like to know if her appointment with Vida on 05/01/23 is \"necessary\"    She notes that she would rather not come as it is a far distance to drive  Does this require a call back? Yes   If a call back is required, please list best call back number [phone]   If a call back is required, advise that a message will be forwarded to their care team and someone will return their call as soon as possible  Did you relay this information to the patient?  Yes     "

## 2023-04-28 NOTE — TELEPHONE ENCOUNTER
Spoke to patient who was asking if she had to drive to the appt with   Kae Lennox   On Monday 5/1 at 2 pm, Pt has vision issues and relies on her son to  her to all her appts  , Vida offered a virtual appt at this visit but wanted patient to know the next appt  Needed to be in person and patient agreed to this  Patient is feeling well and no health issue/fever or otherwise at this time

## 2023-05-01 ENCOUNTER — TELEMEDICINE (OUTPATIENT)
Dept: SURGICAL ONCOLOGY | Facility: CLINIC | Age: 83
End: 2023-05-01

## 2023-05-01 DIAGNOSIS — Z85.850 ENCOUNTER FOR FOLLOW-UP SURVEILLANCE OF THYROID CANCER: Primary | ICD-10-CM

## 2023-05-01 DIAGNOSIS — C77.0 MALIGNANT NEOPLASM OF THYROID METASTATIC TO LYMPH NODE OF NECK (HCC): ICD-10-CM

## 2023-05-01 DIAGNOSIS — C73 MALIGNANT NEOPLASM OF THYROID METASTATIC TO LYMPH NODE OF NECK (HCC): ICD-10-CM

## 2023-05-01 DIAGNOSIS — Z08 ENCOUNTER FOR FOLLOW-UP SURVEILLANCE OF THYROID CANCER: Primary | ICD-10-CM

## 2023-05-01 DIAGNOSIS — Z85.850 HISTORY OF THYROID CANCER: ICD-10-CM

## 2023-05-01 NOTE — PROGRESS NOTES
Virtual Regular Visit    Verification of patient location:    Patient is located at Home in the following state in which I hold an active license [place]      Assessment/Plan:  Recent US shows no evidence of recurrence disease  I will watch for results of her thyroglobulin studies and TFTs, and will call her with any concerns  We will plan to see the patient back for an in-person clinical exam following repeat US in 6 months  Problem List Items Addressed This Visit        Endocrine    Malignant neoplasm of thyroid metastatic to lymph node of neck (HCC)    Relevant Orders    US head neck lymph node mapping       Other    History of thyroid cancer    Relevant Orders    US head neck lymph node mapping    Encounter for follow-up surveillance of thyroid cancer - Primary            Reason for visit is No chief complaint on file  Encounter provider KISHOR Brown    Provider located at [address] [address] [phone]      Recent Visits  Date Type Provider Dept   04/28/23 Telephone [address] recent visits within past 7 days and meeting all other requirements  Today's Visits  Date Type Provider Dept   05/01/23 Telemedicine Vida Hines, [facility] today's visits and meeting all other requirements  Future Appointments  No visits were found meeting these conditions  Showing future appointments within next 150 days and meeting all other requirements       The patient was identified by name and date of birth  Liliana Adames was informed that this is a telemedicine visit and that the visit is being conducted through Telephone due to technical difficulties with the embedded virtual platform  My office door was closed  No one else was in the room    She acknowledged consent and understanding of privacy and "security of the video platform  The patient has agreed to participate and understands they can discontinue the visit at any time  Patient is aware this is a billable service  Subjective  Vidya Gracia is a 81 y o  female with a history of metastatic thyroid cancer  She reports she feels well  She denies any new shortness of breath, cough, worsening dysphagia or voice changes  She will be completing bloodwork for her endocrinologist later this month  US of the head and neck with lymph node mapping was performed on April 24, 2023  I have reviewed these results and discussed them with the patient today  The patient initially underwent total thyroidectomy in 2016, at which time she was diagnosed with high grade papillary carcinoma  Metastatic disease was identified in the left neck in January 2020  Consideration was given to surgery versus MONTEZ  However, the patient opted for observation  Additional metastatic disease in the contralateral neck was then identified in September 2022  She subsequently underwent bilateral neck dissection in October 2022, and MONTEZ was performed in January 2023  She is currently HOLLI at over 6 months         Past Medical History:   Diagnosis Date    Asthma     Breast cancer ([address] )     Left    Chronic pain disorder     Dental crowns present     Dry eyes     Eczema     Fibromyalgia, primary     Generalized arthritis     Glaucoma     History of kidney stones     History of radiation therapy     [place] (hard of hearing)     Hyperlipidemia     Hypertension     Limb alert care status     No BP-IV Left arm    Macular degeneration     Osteopenia     Risk for falls     Shortness of breath     per pt--\"exertional and not new\"    Thyroid cancer ([address] )     Uses walker     occas and occas cane    Wears glasses        Past Surgical History:   Procedure Laterality Date    BREAST LUMPECTOMY Left     2009    CATARACT EXTRACTION Bilateral     CHOLECYSTECTOMY      " "LARYNGOSCOPY N/A 10/18/2022    Procedure: FLEXIBLE LARYNGOSCOPY;  Surgeon: Sabrina Kern MD;  [facility];  Service: Surgical Oncology    RADICAL NECK DISSECTION Bilateral 10/18/2022    Procedure: BILATERAL NECK DISSECTION, FLEXIBLE LARYNGOSCOPY;  Surgeon: Sabrina Kern MD;  [facility];  Service: Surgical Oncology    TH[facility]ROIDECT[facility]Y, PARTIAL  2016    [facility]    US GUIDED LYMPH NODE BIOPSY LEFT  09/06/2022    US GUIDED THYROID BIOPSY  11/07/2016       Current Outpatient Medications   Medication Sig Dispense Refill    acetaminophen (TYLENOL) 325 mg tablet Take 2 tablets (650 mg total) by mouth every 6 (six) hours as needed for mild pain (Patient not taking: Reported on 11/15/2022)  0    CALCIUM PO Take by mouth      dorzolamide-timolol (COSOPT) 22 3-6 8 MG/ML ophthalmic solution INSTILL 1 DROP LEFT EYE 2 TIMES A DAY      fluticasone-salmeterol (ADVAIR HFA) 115-21 MCG/ACT inhaler Inhale 2 puffs daily Rinse mouth after use        GLUCOSAMINE-CHONDROITIN PO Take by mouth in the morning      ibuprofen (MOTRIN) 200 mg tablet Take 3 tablets (600 mg total) by mouth every 6 (six) hours as needed for mild pain Per pt usually once a day will stop one week before surgery per surgeon Do not start before October 22, 2022   0    levothyroxine (Euthyrox) 112 mcg tablet Take 1 tablet (112 mcg total) by mouth daily 90 tablet 3    lisinopril (ZESTRIL) 20 mg tablet Take 20 mg by mouth daily      LUMIGAN 0 01 % ophthalmic drops INSTILL 1 DROP INTO LEFT EYE IN THE EVENING      Multiple Vitamin (multivitamin) tablet Take 1 tablet by mouth daily      Omega-3 Fatty Acids (FISH OIL PO) Take by mouth      omeprazole (PriLOSEC) 10 mg delayed release capsule Take 10 mg by mouth daily      prednisoLONE acetate (PRED FORTE) 1 % ophthalmic suspension Administer 1 drop to the right eye 2 (two) times a day      simvastatin (ZOCOR) 20 mg tablet Take 20 mg by mouth daily at bedtime  1    traMADol " (Ultram) 50 mg tablet Take 1 tablet (50 mg total) by mouth every 6 (six) hours as needed for moderate pain 10 tablet 0     No current facility-administered medications for this visit  Allergies   Allergen Reactions    Amoxicillin GI Intolerance     Nausea    Erythromycin Nausea Only       Review of Systems   Constitutional: Negative for activity change, appetite change, fatigue and unexpected weight change  HENT: Negative for trouble swallowing and voice change  Respiratory: Negative for cough and shortness of breath  Skin: Negative  Neurological: Negative  Hematological: Negative for adenopathy  Psychiatric/Behavioral: Negative  Video Exam    There were no vitals filed for this visit  Physical Exam    Physical exam was not performed  Imaging  NECK ULTRASOUND   04/24/2023  INDICATION:     C73: Malignant neoplasm of thyroid gland      COMPARISON:   None      FINDINGS:     Ultrasound of the thyroidectomy bed and cervical lymph node chains was performed with a high frequency linear transducer  There is no suspicion of recurrent mass in the thyroidectomy bed      Lymph nodes maintain normal morphologic contour, echogenicity and short axis dimensions of less than 0 7 cm  No evidence for microcalcification or focal nodularity  Small lymph nodes are noted with no sonographic suspicious features  A 0 8 x 0 4 x 0 3, 0 5 x 0 3 x 0 3 1 6 x 0 6 x 0 4 in the right ZONE 4      1 1 x 0 8 x 0 2, 0 5 x 0 4 x 0 2 in right zone 5B  Left neck; lymph nodes measuring 1 1 x 0 5 x 0 3, 0 8 x 0 5 x 0 3, 0 5 x 0 5 x 0 4 in zone 4 and 0 9 x 0 5 x 0 2 in zone 5B    IMPRESSION:  No evidence of recurrent or metastatic disease    Small bilateral zone 4 and 5 lymph nodes which do not demonstrates sonographic suspicious features  Further management on the clinical basis       Visit Time  Total Visit Duration: 10 minutes

## 2023-05-18 ENCOUNTER — APPOINTMENT (OUTPATIENT)
Dept: LAB | Facility: HOSPITAL | Age: 83
End: 2023-05-18

## 2023-05-18 DIAGNOSIS — C73 THYROID CANCER (HCC): ICD-10-CM

## 2023-05-18 DIAGNOSIS — E89.0 POSTOPERATIVE HYPOTHYROIDISM: ICD-10-CM

## 2023-05-18 LAB
ALBUMIN SERPL BCP-MCNC: 3.8 G/DL (ref 3.5–5)
ALP SERPL-CCNC: 80 U/L (ref 34–104)
ALT SERPL W P-5'-P-CCNC: 11 U/L (ref 7–52)
ANION GAP SERPL CALCULATED.3IONS-SCNC: 6 MMOL/L (ref 4–13)
AST SERPL W P-5'-P-CCNC: 15 U/L (ref 13–39)
BILIRUB SERPL-MCNC: 0.36 MG/DL (ref 0.2–1)
BUN SERPL-MCNC: 21 MG/DL (ref 5–25)
CALCIUM SERPL-MCNC: 9.3 MG/DL (ref 8.4–10.2)
CHLORIDE SERPL-SCNC: 107 MMOL/L (ref 96–108)
CO2 SERPL-SCNC: 26 MMOL/L (ref 21–32)
CREAT SERPL-MCNC: 0.68 MG/DL (ref 0.6–1.3)
GFR SERPL CREATININE-BSD FRML MDRD: 81 ML/MIN/1.73SQ M
GLUCOSE P FAST SERPL-MCNC: 90 MG/DL (ref 65–99)
POTASSIUM SERPL-SCNC: 4 MMOL/L (ref 3.5–5.3)
PROT SERPL-MCNC: 7 G/DL (ref 6.4–8.4)
SODIUM SERPL-SCNC: 139 MMOL/L (ref 135–147)
T4 FREE SERPL-MCNC: 1.35 NG/DL (ref 0.61–1.12)
T4 SERPL-MCNC: 12.64 UG/DL (ref 6.09–12.23)
TSH SERPL DL<=0.05 MIU/L-ACNC: 0.11 UIU/ML (ref 0.45–4.5)

## 2023-05-19 LAB
T3RU NFR SERPL: 29 % (ref 24–39)
THYROGLOB AB SERPL-ACNC: <1 IU/ML (ref 0–0.9)
THYROGLOB SERPL-MCNC: <0.1 NG/ML (ref 1.5–38.5)

## 2023-05-25 ENCOUNTER — OFFICE VISIT (OUTPATIENT)
Dept: ENDOCRINOLOGY | Facility: CLINIC | Age: 83
End: 2023-05-25

## 2023-05-25 VITALS
HEART RATE: 60 BPM | BODY MASS INDEX: 34.05 KG/M2 | HEIGHT: 57 IN | SYSTOLIC BLOOD PRESSURE: 122 MMHG | DIASTOLIC BLOOD PRESSURE: 80 MMHG | WEIGHT: 157.8 LBS

## 2023-05-25 DIAGNOSIS — C73 THYROID CANCER (HCC): Primary | ICD-10-CM

## 2023-05-25 DIAGNOSIS — E89.0 POSTOPERATIVE HYPOTHYROIDISM: ICD-10-CM

## 2023-05-25 RX ORDER — LEVOTHYROXINE SODIUM 112 UG/1
112 TABLET ORAL DAILY
Qty: 90 TABLET | Refills: 3 | Status: SHIPPED | OUTPATIENT
Start: 2023-05-25

## 2023-05-25 RX ORDER — OMEGA-3 FATTY ACIDS/FISH OIL 300-1000MG
CAPSULE ORAL
COMMUNITY

## 2023-05-25 NOTE — PROGRESS NOTES
5/25/2023    Assessment/Plan      Diagnoses and all orders for this visit:    Thyroid cancer ([facility] [address] )  -     Thyroglobulin; Future  -     Anti-thyroglobulin antibody; Future  -     levothyroxine (Euthyrox) 112 mcg tablet; Take 1 tablet (112 mcg total) by mouth daily    Postoperative hypothyroidism  -     TSH, 3rd generation; Future  -     T4, free; Future  -     levothyroxine (Euthyrox) 112 mcg tablet; Take 1 tablet (112 mcg total) by mouth daily    Other orders  -     Ibuprofen (Advil) 200 MG CAPS; Take by mouth        Assessment/Plan:  1  Thyroid cancer: Thyroglobulin remains undetectable  Ultrasound findings show no evidence of residual recurrent disease  Plan to repeat blood work in 6 months  I think a TSH goal of 0 1-0 5 is reasonable given history of recurrence and recent treatment  2   Hypothyroidism: Continue current regimen  CC: Follow-up    History of Present Illness     HPI: Tonie Lee is a 81y o  year old female with history of papillary thyroid cancer as well as hypothyroidism  In 2016, she was found to have a neck mass and biopsy showed papillary thyroid cancer  She underwent thyroidectomy  She did not receive radioactive iodine at that time  Initial pathology result suggested classic variant but more recent evaluation at the [address] [place] [address] suggested a tall cell variant  More recently she had concern in 2019 and 2020 for local recurrence  Her that time we had discussed surgical treatment for local recurrence which she declined  More recently she underwent bilateral neck dissection in October of 2022 with 1/3 right-sided lymph nodes positive for metastatic thyroid cancer and 1/5 lymph nodes on the left side positive  She then received 124 2 mCi of I-131 in January 2023  Thyroglobulin was 2 7 with Thyrogen stimulation  She continues on levothyroxine at a dose of 112 mcg daily  She presents today overall feeling well  No neck compressive symptoms      Review of "Systems   Constitutional: Negative for fatigue  HENT: Negative for trouble swallowing and voice change  Eyes: Negative for visual disturbance  Respiratory: Negative for shortness of breath  Cardiovascular: Negative for palpitations and leg swelling  Gastrointestinal: Negative for abdominal pain, nausea and vomiting  Endocrine: Negative for polydipsia and polyuria  Musculoskeletal: Negative for arthralgias and myalgias  Skin: Negative for rash  Neurological: Negative for dizziness, tremors and weakness  Hematological: Negative for adenopathy  Psychiatric/Behavioral: Negative for agitation and confusion         Historical Information   Past Medical History:   Diagnosis Date   • Asthma    • Breast cancer ([facility] )     Left   • Chronic pain disorder    • Dental crowns present    • Dry eyes    • Eczema    • Fibromyalgia, primary    • Generalized arthritis    • Glaucoma    • History of kidney stones    • History of radiation therapy    • [place] (hard of hearing)    • Hyperlipidemia    • Hypertension    • Limb alert care status     No BP-IV Left arm   • Macular degeneration    • Osteopenia    • Risk for falls    • Shortness of breath     per pt--\"exertional and not new\"   • Thy[facility] ([facility] )    • Uses walker     occas and occas cane   • Wears glasses      Past Surgical History:   Procedure Laterality Date   • BREAST LUMPECTOMY Left     2009   • CATARACT EXTRACTION Bilateral    • CHOLECYSTECTOMY     • LARYNGOSCOPY N/A 10/18/2022    Procedure: FLEXIBLE LARYNGOSCOPY;  Surgeon: Dallas Granados MD;  [facility];  Service: Surgical Oncology   • RADICAL NECK DISSECTION Bilateral 10/18/2022    Procedure: BILATERAL NECK DISSECTION, FLEXIBLE LARYNGOSCOPY;  Surgeon: Dallas Granados MD;  Location[facility];  Service: Surgical Oncology   • THYROIDECTOMY, PARTIAL  2016    \"[facility]\"   • US GUIDED LYMPH NODE BIOPSY LEFT  09/06/2022   • Julien Luu THYROID BIOPSY  11/07/2016     Social History   Social " History     Substance and Sexual Activity   Alcohol Use Not Currently     Social History     Substance and Sexual Activity   Drug Use Never     Social History     Tobacco Use   Smoking Status Never   Smokeless Tobacco Never     Family History:   Family History   Problem Relation Age of Onset   • Heart disease Mother    • Heart disease Father    • Lupus Sister        Meds/Allergies   Current Outpatient Medications   Medication Sig Dispense Refill   • CALCIUM PO Take by mouth     • dorzolamide-timolol (COSOPT) 22 3-6 8 MG/ML ophthalmic solution INSTILL 1 DROP LEFT EYE 2 TIMES A DAY     • fluticasone-salmeterol (ADVAIR HFA) 115-21 MCG/ACT inhaler Inhale 2 puffs daily Rinse mouth after use  • GLUCOSAMINE-CHONDROITIN PO Take by mouth in the morning     • Ibuprofen (Advil) 200 MG CAPS Take by mouth     • levothyroxine (Euthyrox) 112 mcg tablet Take 1 tablet (112 mcg total) by mouth daily 90 tablet 3   • lisinopril (ZESTRIL) 20 mg tablet Take 20 mg by mouth daily     • LUMIGAN 0 01 % ophthalmic drops INSTILL 1 DROP INTO LEFT EYE IN THE EVENING     • Multiple Vitamin (multivitamin) tablet Take 1 tablet by mouth daily     • Omega-3 Fatty Acids (FISH OIL PO) Take by mouth     • omeprazole (PriLOSEC) 10 mg delayed release capsule Take 10 mg by mouth daily     • prednisoLONE acetate (PRED FORTE) 1 % ophthalmic suspension Administer 1 drop to the right eye 2 (two) times a day     • simvastatin (ZOCOR) 20 mg tablet Take 20 mg by mouth daily at bedtime  1   • acetaminophen (TYLENOL) 325 mg tablet Take 2 tablets (650 mg total) by mouth every 6 (six) hours as needed for mild pain (Patient not taking: Reported on 11/15/2022)  0   • ibuprofen (MOTRIN) 200 mg tablet Take 3 tablets (600 mg total) by mouth every 6 (six) hours as needed for mild pain Per pt usually once a day will stop one week before surgery per surgeon Do not start before October 22, 2022   (Patient not taking: Reported on 5/25/2023)  0   • traMADol (Ultram) 50 mg "tablet Take 1 tablet (50 mg total) by mouth every 6 (six) hours as needed for moderate pain (Patient not taking: Reported on 5/25/2023) 10 tablet 0     No current facility-administered medications for this visit  Allergies   Allergen Reactions   • Amoxicillin GI Intolerance     Nausea   • Erythromycin Nausea Only       Objective   Vitals: Blood pressure 122/80, pulse 60, height 4' 9\" (1 448 m), weight 71 6 kg (157 lb 12 8 oz)  Invasive Devices     Peripheral Intravenous Line  Duration           Peripheral IV 10/18/22 Distal;Dorsal (posterior); Right Forearm 219 days          Drain  Duration           Closed/Suction Drain Left Neck Bulb 10 Fr  218 days    Closed/Suction Drain Right Neck Bulb 10 Fr  218 days                Physical Exam  Vitals reviewed  Constitutional:       General: She is not in acute distress  Appearance: She is well-developed  She is not diaphoretic  HENT:      Head: Normocephalic and atraumatic  Eyes:      Conjunctiva/sclera: Conjunctivae normal       Pupils: Pupils are equal, round, and reactive to light  Neck:      Thyroid: No thyromegaly  Cardiovascular:      Rate and Rhythm: Normal rate and regular rhythm  Pulmonary:      Effort: Pulmonary effort is normal  No respiratory distress  Breath sounds: Normal breath sounds  Abdominal:      General: Bowel sounds are normal       Palpations: Abdomen is soft  Musculoskeletal:         General: Normal range of motion  Cervical back: Normal range of motion and neck supple  Lymphadenopathy:      Cervical: No cervical adenopathy  Skin:     General: Skin is warm and dry  Findings: No rash  Neurological:      Mental Status: She is alert and oriented to person, place, and time  Motor: No abnormal muscle tone  Psychiatric:         Behavior: Behavior normal          The history was obtained from the review of the chart and from the patient      Lab Results:      Component      Latest Ref Rng & Units 5/18/2023 " Sodium      135 - 147 mmol/L 139   Potassium      3 5 - 5 3 mmol/L 4 0   Chloride      96 - 108 mmol/L 107   CO2      21 - 32 mmol/L 26   Anion Gap      4 - 13 mmol/L 6   BUN      5 - 25 mg/dL 21   Creatinine      0 60 - 1 30 mg/dL 0 68   GLUCOSE FASTING      65 - 99 mg/dL 90   Calcium      8 4 - 10 2 mg/dL 9 3   AST      13 - 39 U/L 15   ALT      7 - 52 U/L 11   Alkaline Phosphatase      34 - 104 U/L 80   Total Protein      6 4 - 8 4 g/dL 7 0   Albumin      3 5 - 5 0 g/dL 3 8   TOTAL BILIRUBIN      0 20 - 1 00 mg/dL 0 36   eGFR      ml/min/1 73sq m 81   TSH 3RD GENERATON      0 450 - 4 500 uIU/mL 0 111 (L)   Free T4      0 61 - 1 12 ng/dL 1 35 (H)   THYROGLOBULIN AB      0 0 - 0 9 IU/mL <1 0   Thyroglobulin-ANNELISE      1 5 - 38 5 ng/mL <0 1 (L)     4/24/23:  NECK ULTRASOUND     INDICATION:     C73: Malignant neoplasm of thyroid gland      COMPARISON:   None      FINDINGS:     Ultrasound of the thyroidectomy bed and cervical lymph node chains was performed with a high frequency linear transducer  There is no suspicion of recurrent mass in the thyroidectomy bed      Lymph nodes maintain normal morphologic contour, echogenicity and short axis dimensions of less than 0 7 cm  No evidence for microcalcification or focal nodularity  Small lymph nodes are noted with no sonographic suspicious features    A 0 8 x 0 4 x 0 3, 0 5 x 0 3 x 0 3 1 6 x 0 6 x 0 4 in the right ZONE 4      1 1 x 0 8 x 0 2, 0 5 x 0 4 x 0 2 in right zone 5B  Left neck; lymph nodes measuring 1 1 x 0 5 x 0 3, 0 8 x 0 5 x 0 3, 0 5 x 0 5 x 0 4 in zone 4 and 0 9 x 0 5 x 0 2 in zone 5B  IMPRESSION:     No evidence of recurrent or metastatic disease      Small bilateral zone 4 and 5 lymph nodes which do not demonstrates sonographic suspicious features  Further management on the clinical basis     Workstation performed: [id number]    Future Appointments   Date Time Provider Lux Kirk   11/1/2023 12:30 PM [facility] [address]   11/8/2023 "1:15 PM Eder Ball MD SURG [facility]   11/27/2023  2:00 PM Joseph Shelton PA-C [facility]       Portions of the record may have been created with voice recognition software  Occasional wrong word or \"sound a like\" substitutions may have occurred due to the inherent limitations of voice recognition software  Read the chart carefully and recognize, using context, where substitutions have occurred      " Wheelchair

## 2023-11-01 ENCOUNTER — TELEPHONE (OUTPATIENT)
Dept: HEMATOLOGY ONCOLOGY | Facility: CLINIC | Age: 83
End: 2023-11-01

## 2023-11-01 NOTE — TELEPHONE ENCOUNTER
I called [facility] regarding an appointment that they have scheduled with Dr. Kayley Quesada scheduled on  11/8/23      I left a voicemail explaining to patient that this appointment will need to be rescheduled due to a change in the providers schedule. Patient was advised to call [facility] to reschedule.   Another attempt will be made to reschedule

## 2023-11-02 ENCOUNTER — TELEPHONE (OUTPATIENT)
Dept: HEMATOLOGY ONCOLOGY | Facility: CLINIC | Age: 83
End: 2023-11-02

## 2023-11-02 ENCOUNTER — HOSPITAL ENCOUNTER (OUTPATIENT)
Dept: ULTRASOUND IMAGING | Facility: HOSPITAL | Age: 83
End: 2023-11-02
Payer: COMMERCIAL

## 2023-11-02 DIAGNOSIS — C77.0 MALIGNANT NEOPLASM OF THYROID METASTATIC TO LYMPH NODE OF NECK (HCC): ICD-10-CM

## 2023-11-02 DIAGNOSIS — C73 MALIGNANT NEOPLASM OF THYROID METASTATIC TO LYMPH NODE OF NECK (HCC): ICD-10-CM

## 2023-11-02 DIAGNOSIS — Z85.850 HISTORY OF THYROID CANCER: ICD-10-CM

## 2023-11-02 PROCEDURE — 76536 US EXAM OF HEAD AND NECK: CPT

## 2023-11-02 NOTE — TELEPHONE ENCOUNTER
I called [facility] regarding an appointment that they have scheduled with Dr. Vilma Montemayor scheduled on  11/8/23      The patient answered the phone and stated that she could not hear me, and disconnected the line.  Another attempt will be made to reschedule

## 2023-11-03 ENCOUNTER — TELEPHONE (OUTPATIENT)
Dept: HEMATOLOGY ONCOLOGY | Facility: CLINIC | Age: 83
End: 2023-11-03

## 2023-11-03 NOTE — TELEPHONE ENCOUNTER
Appointment Change  Cancel, Reschedule, Change to Virtual      Who are you speaking with? Patient   If it is not the patient, is the caller listed on the communication consent form? N/A   Which provider is the appointment scheduled with? Dr. Jonatan Gil   When was the original appointment scheduled? Please list date and time 11/8/23 1:15PM   At which location is the appointment scheduled to take place? [id number]   Was the appointment rescheduled? Was the appointment changed from an in person visit to a virtual visit? If so, please list the details of the change. 12/12/23 1:45PM   What is the reason for the appointment change?  Provider

## 2023-11-07 LAB
T4 FREE SERPL-MCNC: 1.75 NG/DL (ref 0.82–1.77)
THYROGLOB AB SERPL-ACNC: <1 IU/ML (ref 0–0.9)
THYROGLOB SERPL-MCNC: <0.1 NG/ML (ref 1.5–38.5)
TSH SERPL DL<=0.005 MIU/L-ACNC: 0.52 UIU/ML (ref 0.45–4.5)

## 2023-11-27 ENCOUNTER — OFFICE VISIT (OUTPATIENT)
Dept: ENDOCRINOLOGY | Facility: CLINIC | Age: 83
End: 2023-11-27
Payer: COMMERCIAL

## 2023-11-27 VITALS
HEART RATE: 62 BPM | WEIGHT: 161 LBS | BODY MASS INDEX: 34.73 KG/M2 | SYSTOLIC BLOOD PRESSURE: 130 MMHG | DIASTOLIC BLOOD PRESSURE: 84 MMHG | HEIGHT: 57 IN

## 2023-11-27 DIAGNOSIS — Z85.850 HISTORY OF THYROID CANCER: ICD-10-CM

## 2023-11-27 DIAGNOSIS — C73 THYROID CANCER (HCC): Primary | ICD-10-CM

## 2023-11-27 DIAGNOSIS — M81.0 OSTEOPOROSIS, UNSPECIFIED OSTEOPOROSIS TYPE, UNSPECIFIED PATHOLOGICAL FRACTURE PRESENCE: ICD-10-CM

## 2023-11-27 DIAGNOSIS — E89.0 POSTOPERATIVE HYPOTHYROIDISM: ICD-10-CM

## 2023-11-27 PROCEDURE — 99214 OFFICE O/P EST MOD 30 MIN: CPT | Performed by: PHYSICIAN ASSISTANT

## 2023-11-27 NOTE — ASSESSMENT & PLAN NOTE
No evidence of recurrence based on recent ultrasound and Thyroglobulin panel. Will continue to monitor.

## 2023-11-27 NOTE — ASSESSMENT & PLAN NOTE
She is following with her PCP. She had recent DEXA Scan at [place] through her PCP, but has not gotten results. Reports that she did not tolerate fosamax in the past.  Suggest that she discuss option of injectable/IV medications with her family physician to reduce risk of fracture.

## 2023-11-27 NOTE — PROGRESS NOTES
Established Patient Progress Note       Chief Complaint   Patient presents with   • Thyroid Cancer        Impression & Plan:    Problem List Items Addressed This Visit        Endocrine    Postoperative hypothyroidism     TSH at goal. Continue levothyroxine at current dosing. Relevant Orders    TSH, 3rd generation    T4, free    Thyroglobulin       Musculoskeletal and Integument    Osteoporosis     She is following with her PCP. She had recent DEXA Scan at [place] through her PCP, but has not gotten results. Reports that she did not tolerate fosamax in the past.  Suggest that she discuss option of injectable/IV medications with her family physician to reduce risk of fracture. Other    History of thyroid cancer     No evidence of recurrence based on recent ultrasound and Thyroglobulin panel. Will continue to monitor. Other Visit Diagnoses     Thyroid cancer ([address])    -  Primary    Relevant Orders    TSH, 3rd generation    T4, free    Thyroglobulin            Orders Placed This Encounter   Procedures   • TSH, 3rd generation     This is a patient instruction: This test is non-fasting. Please drink two glasses of water morning of bloodwork. Standing Status:   Future     Number of Occurrences:   1     Standing Expiration Date:   11/27/2024   • T4, free     Standing Status:   Future     Number of Occurrences:   1     Standing Expiration Date:   11/27/2024   • Thyroglobulin     Standing Status:   Future     Number of Occurrences:   1     Standing Expiration Date:   11/27/2024         History of Present Illness:     Marvin Dickerson is a 81 y.o. female with a history of thyroid cancer diagnosed in 2016 and Hypothyroidism. For the thyroid cancer, see  history below from Dr. Prasanth Amado note on 5/15/2023  "In 2016, she was found to have a neck mass and biopsy showed papillary thyroid cancer. She underwent thyroidectomy. She did not receive radioactive iodine at that time. Initial pathology result suggested classic variant but more recent evaluation at the [address] [place] [address] suggested a tall cell variant. More recently she had concern in 2019 and 2020 for local recurrence. Her that time we had discussed surgical treatment for local recurrence which she declined. More recently she underwent bilateral neck dissection in October of 2022 with 1/3 right-sided lymph nodes positive for metastatic thyroid cancer and 1/5 lymph nodes on the left side positive. She then received 124.2 mCi of I-131 in January 2023. Thyroglobulin was 2.7 with Thyrogen stimulation"    Since her last visit, she had recent lab testing that showed undetectable thyroglobulin. Lymph node mapping from 11/2/2023 showed no evidence of recurrent/metastatic disease. Her TSH goal is 0.1 to 0.5    For hypothyroidism, she is taking levothyroxine 112mcg daily. In general, she is feeling well with no complaints. She does get lightheadedness at time and worries about falls. She does use a cane for balance. She reports that she has had  recent DEXA through PCP but has not yet gotten results. She had taken fosamax in the past but had to stop due to GI side effects. She is taking calcium/Vit D supplements.      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 ([address])   • Osteoporosis      Past Medical History:   Diagnosis Date   • Asthma    • Breast cancer ([address])     Left   • Chronic pain disorder    • Dental crowns present    • Dry eyes    • Eczema    • Fibromyalgia, primary    • Generalized arthritis    • Glaucoma    • History of kidney stones    • History of radiation therapy    • [place] (hard of hearing)    • Hyperlipidemia    • Hypertension    • Limb alert care status     No BP-IV Left arm   • Macular degeneration    • Osteopenia    • Risk for falls    • Shortness of breath     per pt--"exertional and not new"   • Thyroid cancer ([address])    • Uses walker     occas and occas cane   • Wears glasses       Past Surgical History:   Procedure Laterality Date   • BREAST LUMPECTOMY Left     2009   • CATARACT EXTRACTION Bilateral    • CHOLECYSTECTOMY     • LARYNGOSCOPY N/A 10/18/2022    Procedure: Heike Talbot;  Surgeon: Dashawn Srinivasan MD;  [facility];  Service: Surgical Oncology   • RADICAL NECK DISSECTION Bilateral 10/18/2022    Procedure: BILATERAL NECK DISSECTION, FLEXIBLE LARYNGOSCOPY;  Surgeon: Dashawn Srinivasan MD;  [facility];  Service: Surgical Oncology   • THYROIDECTOMY, PARTIAL  2016    "[address]"   • US GUIDED LYMPH NODE BIOPSY LEFT  09/06/2022   • US GUIDED THYROID BIOPSY  11/07/2016      Family History   Problem Relation Age of Onset   • Heart disease Mother    • Heart disease Father    • Lupus Sister      Social History     Tobacco Use   • Smoking status: Never   • Smokeless tobacco: Never   Substance Use Topics   • Alcohol use: Not Currently     Allergies   Allergen Reactions   • Amoxicillin GI Intolerance     Nausea   • Erythromycin Nausea Only       Current Outpatient Medications:   •  acetaminophen (TYLENOL) 325 mg tablet, Take 2 tablets (650 mg total) by mouth every 6 (six) hours as needed for mild pain, Disp: , Rfl: 0  •  CALCIUM PO, Take by mouth, Disp: , Rfl:   •  dorzolamide-timolol (COSOPT) 22.3-6.8 MG/ML ophthalmic solution, INSTILL 1 DROP LEFT EYE 2 TIMES A DAY, Disp: , Rfl:   •  fluticasone-salmeterol (ADVAIR HFA) 115-21 MCG/ACT inhaler, Inhale 2 puffs daily Rinse mouth after use., Disp: , Rfl:   •  GLUCOSAMINE-CHONDROITIN PO, Take by mouth in the morning, Disp: , Rfl:   •  Ibuprofen (Advil) 200 MG CAPS, Take by mouth, Disp: , Rfl:   •  levothyroxine (Euthyrox) 112 mcg tablet, Take 1 tablet (112 mcg total) by mouth daily, Disp: 90 tablet, Rfl: 3  • lisinopril (ZESTRIL) 20 mg tablet, Take 20 mg by mouth daily, Disp: , Rfl:   •  LUMIGAN 0.01 % ophthalmic drops, INSTILL 1 DROP INTO LEFT EYE IN THE EVENING, Disp: , Rfl:   •  Multiple Vitamin (multivitamin) tablet, Take 1 tablet by mouth daily, Disp: , Rfl:   •  Omega-3 Fatty Acids (FISH OIL PO), Take by mouth, Disp: , Rfl:   •  omeprazole (PriLOSEC) 10 mg delayed release capsule, Take 10 mg by mouth daily, Disp: , Rfl:   •  simvastatin (ZOCOR) 20 mg tablet, Take 20 mg by mouth daily at bedtime, Disp: , Rfl: 1  •  ibuprofen (MOTRIN) 200 mg tablet, Take 3 tablets (600 mg total) by mouth every 6 (six) hours as needed for mild pain Per pt usually once a day will stop one week before surgery per surgeon Do not start before October 22, 2022. (Patient not taking: Reported on 5/25/2023), Disp: , Rfl: 0  •  prednisoLONE acetate (PRED FORTE) 1 % ophthalmic suspension, Administer 1 drop to the right eye 2 (two) times a day (Patient not taking: Reported on 11/27/2023), Disp: , Rfl:   •  traMADol (Ultram) 50 mg tablet, Take 1 tablet (50 mg total) by mouth every 6 (six) hours as needed for moderate pain (Patient not taking: Reported on 5/25/2023), Disp: 10 tablet, Rfl: 0    Review of Systems   Constitutional:  Negative for activity change, appetite change, chills, diaphoresis, fatigue, fever and unexpected weight change. HENT:  Negative for trouble swallowing and voice change. Eyes:  Negative for visual disturbance. Respiratory:  Negative for shortness of breath. Cardiovascular:  Negative for chest pain and palpitations. Gastrointestinal:  Negative for abdominal pain, constipation and diarrhea. Endocrine: Negative for cold intolerance, heat intolerance, polydipsia, polyphagia and polyuria. Genitourinary:  Negative for frequency and menstrual problem. Musculoskeletal:  Positive for gait problem (uses a cane). Negative for arthralgias and myalgias. Skin:  Negative for rash.    Allergic/Immunologic: Negative for food allergies. Neurological:  Positive for light-headedness. Negative for dizziness and tremors. Hematological:  Negative for adenopathy. Psychiatric/Behavioral:  Negative for sleep disturbance. All other systems reviewed and are negative. Physical Exam:  Body mass index is 34.84 kg/m². /84 (BP Location: Right arm, Patient Position: Sitting, Cuff Size: Standard)   Pulse 62   Ht 4' 9" (1.448 m)   Wt 73 kg (161 lb)   BMI 34.84 kg/m²    Wt Readings from Last 3 Encounters:   11/27/23 73 kg (161 lb)   05/25/23 71.6 kg (157 lb 12.8 oz)   11/15/22 73.2 kg (161 lb 6.4 oz)       Physical Exam  Vitals reviewed. Constitutional:       General: She is not in acute distress. Appearance: Normal appearance. She is well-developed. She is not toxic-appearing. HENT:      Head: Normocephalic and atraumatic. Eyes:      Conjunctiva/sclera: Conjunctivae normal.      Pupils: Pupils are equal, round, and reactive to light. Neck:      Thyroid: No thyromegaly. Cardiovascular:      Rate and Rhythm: Normal rate and regular rhythm. Heart sounds: Normal heart sounds. Pulmonary:      Effort: Pulmonary effort is normal. No respiratory distress. Breath sounds: Normal breath sounds. No wheezing or rales. Abdominal:      General: Bowel sounds are normal. There is no distension. Palpations: Abdomen is soft. Tenderness: There is no abdominal tenderness. Musculoskeletal:         General: Deformity (kyphosis) present. Normal range of motion. Cervical back: Normal range of motion and neck supple. Skin:     General: Skin is warm and dry. Neurological:      Mental Status: She is alert and oriented to person, place, and time.    Psychiatric:         Mood and Affect: Mood normal.         Behavior: Behavior normal.         Labs:   Component      Latest Ref Rng 11/6/2023   TSH, POC      0.450 - 4.500 uIU/mL 0.519    THYROGLOBULIN AB      0.0 - 0.9 IU/mL <1.0    Thyroglobulin-ANNELISE      1.5 - 38.5 ng/mL <0.1 (L)       Legend:  (L) Low    There are no Patient Instructions on file for this visit. Discussed with the patient and all questioned fully answered. She will call me if any problems arise. Follow-up appointment in 6 months.      Counseled patient on diagnostic results, prognosis, risk and benefit of treatment options, instruction for management, importance of treatment compliance, Risk  factor reduction and impressions    Yecenia Armijo PA-C

## 2023-12-12 ENCOUNTER — OFFICE VISIT (OUTPATIENT)
Dept: SURGICAL ONCOLOGY | Facility: CLINIC | Age: 83
End: 2023-12-12
Payer: COMMERCIAL

## 2023-12-12 VITALS
OXYGEN SATURATION: 98 % | SYSTOLIC BLOOD PRESSURE: 130 MMHG | HEIGHT: 57 IN | BODY MASS INDEX: 33.44 KG/M2 | TEMPERATURE: 96.6 F | HEART RATE: 79 BPM | DIASTOLIC BLOOD PRESSURE: 80 MMHG | WEIGHT: 155 LBS

## 2023-12-12 DIAGNOSIS — Z85.850 HISTORY OF THYROID CANCER: ICD-10-CM

## 2023-12-12 DIAGNOSIS — Z08 ENCOUNTER FOR FOLLOW-UP SURVEILLANCE OF THYROID CANCER: Primary | ICD-10-CM

## 2023-12-12 DIAGNOSIS — Z85.850 ENCOUNTER FOR FOLLOW-UP SURVEILLANCE OF THYROID CANCER: Primary | ICD-10-CM

## 2023-12-12 PROCEDURE — 99214 OFFICE O/P EST MOD 30 MIN: CPT | Performed by: SURGERY

## 2023-12-12 RX ORDER — MOXIFLOXACIN 5 MG/ML
SOLUTION/ DROPS OPHTHALMIC
COMMUNITY
Start: 2023-11-01

## 2023-12-12 NOTE — PROGRESS NOTES
Surgical Oncology Follow Up       [address] [facility] [place]  [address]  [facility] [address]  [address]O [facility]  1940  [id number]  [address] [facility] SURG[facility] Pia Vences  [address]  [address] [address] [facility] [address] [address] [phone]    Chief Complaint   Patient presents with    Follow-up       Assessment/Plan:    No problem-specific Assessment & Plan notes found for this encounter. Diagnoses and all orders for this visit:    Encounter for follow-up surveillance of thyroid cancer    History of thyroid cancer    Other orders  -     moxifloxacin (VIGAMOX) 0.5 % ophthalmic solution; INSTILL 1 DROP INTO LEFT EYE EVERY TWO HOURS WHILE AWAKE FOR 2 DAYS      Advance Care Planning/Advance Directives:  Discussed disease status, cancer treatment plans and/or cancer treatment goals with the patient. Oncology History   History of thyroid cancer   1/21/2020 Surgery    Slides (3) labeled [id number] from [facility] (obtained on: 01/21/2020)   A. 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 8/8/2022 Biopsy    Neck, Soft Tissue Mass, Left neck Ultrasound-guided Needle Biopsy (3 slides [id number] [facility], collected 7/12/2022):  - Papillary thyroid carcinoma, focally invading fibrous tissue. 8/26/2022 Surgery    Thyroid, Thyroid Right Lobectomy Subtotal Left Lobectomy Thyroid Tissue ( 6 slides [facility], collected 11/17/16):  A) Right thyroid lobectomy and sub total left lobectomy (6 slides):  - Papillary thyroid carcinoma, tall cell variant, with necrosis (high grade papillary carcinoma),3.2 cm, unencapsulated. - Surgical margins appear negative for carcinoma.   - Background nodular adenomatous hyperplasia. B.  Thyroid, :   - Nodular adenomatous hyperplasia of thyroid. ----  Slides reviewed by [facility] pathology:  1. Slides (6) labeled [id number] from [facility] (obtained on: 11/17/2016):   A. Thyroid, right, lobectomy:   -  Papillary thyroid carcinoma, tall cell variant, with necrosis (high grade papillary carcinoma),3.2 cm, unencapsulated,    - Background thyroid tissue with adenomatous nodule. B. Thyroid, left lobe, subtotal lobectomy:   - Thyroid tissue with adenomatous nodule. 9/6/2022 Biopsy    Lymph Node, Right, Zone 5A (ThinPrep and smear preparations):  Conclusive Evidence of Malignancy  Metastatic carcinoma. , consistent with Metastasis from the patient know papillary thyroid carcinoma      10/18/2022 Surgery    A. Right modified neck dissection-level 3 lymph nodes:      - Metastatic papillary thyroid carcinoma in one of five lymph nodes (1/5)      - Size or largest metastatic deposit: 2.8cm       - Extranodal extension: Present      B. Right level 2-neck:      - Three lymph nodes, negative for malignancy (0/3)     C. Left level 5 lymph node:      - Metastatic papillary thyroid carcinoma in one of two lymph nodes (1/2)      - Size or largest metastatic deposit: 3.5cm       - Extranodal extension: Present          History of Present Illness: Patient is a 80-year-old woman with a history of metastatic thyroid cancer status post total thyroidectomy done at an outside hospital followed by bilateral neck dissection here. -Interval History: She has no new complaints to report since her last visit. Review of Systems:  Review of Systems   Constitutional: Negative. HENT: Negative. Eyes: Negative. Respiratory: Negative. Cardiovascular: Negative. Gastrointestinal: Negative. Endocrine: Negative. Genitourinary: Negative. Musculoskeletal: Negative. Skin: Negative. Allergic/Immunologic: Negative. Neurological: Negative. Hematological: Negative. Psychiatric/Behavioral: Negative.      All other systems reviewed and are negative. 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 ([address])    Osteoporosis     Past Medical History:   Diagnosis Date    Asthma     Breast cancer ([address])     Left    Chronic pain disorder     Dental crowns present     Dry eyes     Eczema     Fibromyalgia, primary     Generalized arthritis     Glaucoma     History of kidney stones     History of radiation therapy     [place] (hard of hearing)     Hyperlipidemia     Hypertension     Limb alert care status     No BP-IV Left arm    Macular degeneration     Osteopenia     Risk for falls     Shortness of breath     per pt--"exertional and not new"    Thyroid cancer ([address])     Uses walker     occas and occas cane    Wears glasses      Past Surgical History:   Procedure Laterality Date    BREAST LUMPECTOMY Left     2009    CATARACT EXTRACTION Bilateral     CHOLECYSTECTOMY      LARYNGOSCOPY N/A 10/18/2022    Procedure: FLEXIBLE LARYNGOSCOPY;  Surgeon: Sourav Oropeza MD;  Location: [facility];  Service: Surgical Oncology    RADICAL NECK DISSECTION Bilateral 10/18/2022    Procedure: BILATERAL NECK DISSECTION, FLEXIBLE LARYNGOSCOPY;  Surgeon: Sourav Oropeza MD;  Location: [facility];  Service: Surgical Oncology    THYROIDECTOMY, PARTIAL  2016    "[address]"    US GUIDED LYMPH NODE BIOPSY LEFT  09/06/2022    US GUIDED THYROID BIOPSY  11/07/2016     Family History   Problem Relation Age of Onset    Heart disease Mother     Heart disease Father     Lupus Sister      Social History     Socioeconomic History    Marital status:       Spouse name: Not on file    Number of children: Not on file    Years of education: Not on file    Highest education level: Not on file   Occupational History    Not on file   Tobacco Use    Smoking status: Never    Smokeless tobacco: Never   Vaping Use    Vaping Use: Never used   Substance and Sexual Activity    Alcohol use: Not Currently    Drug use: Never    Sexual activity: Not on file     Comment: defer   Other Topics Concern    Not on file   Social History Narrative    Not on file     Social Determinants of Health     Financial Resource Strain: Not on file   Food Insecurity: No Food Insecurity (10/19/2022)    Hunger Vital Sign     Worried About Running Out of Food in the Last Year: Never true     Ran Out of Food in the Last Year: Never true   Transportation Needs: No Transportation Needs (10/19/2022)    PRAPARE - Transportation     Lack of Transportation (Medical): No     Lack of Transportation (Non-Medical):  No   Physical Activity: Not on file   Stress: Not on file   Social Connections: Not on file   Intimate Partner Violence: Not on file   Housing Stability: Low Risk  (10/19/2022)    Housing Stability Vital Sign     Unable to Pay for Housing in the Last Year: No     Number of Places Lived in the Last Year: 1     Unstable Housing in the Last Year: No       Current Outpatient Medications:     acetaminophen (TYLENOL) 325 mg tablet, Take 2 tablets (650 mg total) by mouth every 6 (six) hours as needed for mild pain, Disp: , Rfl: 0    CALCIUM PO, Take by mouth, Disp: , Rfl:     dorzolamide-timolol (COSOPT) 22.3-6.8 MG/ML ophthalmic solution, INSTILL 1 DROP LEFT EYE 2 TIMES A DAY, Disp: , Rfl:     fluticasone-salmeterol (ADVAIR HFA) 115-21 MCG/ACT inhaler, Inhale 2 puffs daily Rinse mouth after use., Disp: , Rfl:     GLUCOSAMINE-CHONDROITIN PO, Take by mouth in the morning, Disp: , Rfl:     Ibuprofen (Advil) 200 MG CAPS, Take by mouth, Disp: , Rfl:     levothyroxine (Euthyrox) 112 mcg tablet, Take 1 tablet (112 mcg total) by mouth daily, Disp: 90 tablet, Rfl: 3    lisinopril (ZESTRIL) 20 mg tablet, Take 20 mg by mouth daily, Disp: , Rfl:     LUMIGAN 0.01 % ophthalmic drops, INSTILL 1 DROP INTO LEFT EYE IN THE EVENING, Disp: , Rfl: moxifloxacin (VIGAMOX) 0.5 % ophthalmic solution, INSTILL 1 DROP INTO LEFT EYE EVERY TWO HOURS WHILE AWAKE FOR 2 DAYS, Disp: , Rfl:     Multiple Vitamin (multivitamin) tablet, Take 1 tablet by mouth daily, Disp: , Rfl:     Omega-3 Fatty Acids (FISH OIL PO), Take by mouth, Disp: , Rfl:     omeprazole (PriLOSEC) 10 mg delayed release capsule, Take 10 mg by mouth daily, Disp: , Rfl:     simvastatin (ZOCOR) 20 mg tablet, Take 20 mg by mouth daily at bedtime, Disp: , Rfl: 1    ibuprofen (MOTRIN) 200 mg tablet, Take 3 tablets (600 mg total) by mouth every 6 (six) hours as needed for mild pain Per pt usually once a day will stop one week before surgery per surgeon Do not start before October 22, 2022. (Patient not taking: Reported on 5/25/2023), Disp: , Rfl: 0    prednisoLONE acetate (PRED FORTE) 1 % ophthalmic suspension, Administer 1 drop to the right eye 2 (two) times a day (Patient not taking: Reported on 11/27/2023), Disp: , Rfl:     traMADol (Ultram) 50 mg tablet, Take 1 tablet (50 mg total) by mouth every 6 (six) hours as needed for moderate pain (Patient not taking: Reported on 5/25/2023), Disp: 10 tablet, Rfl: 0  Allergies   Allergen Reactions    Amoxicillin GI Intolerance     Nausea    Erythromycin Nausea Only     There were no vitals filed for this visit. Physical Exam  Vitals reviewed. Constitutional:       Appearance: Normal appearance. HENT:      Head: Normocephalic and atraumatic. Right Ear: External ear normal.      Left Ear: External ear normal.      Mouth/Throat:      Mouth: Mucous membranes are moist.   Eyes:      Extraocular Movements: Extraocular movements intact. Pupils: Pupils are equal, round, and reactive to light. Cardiovascular:      Rate and Rhythm: Normal rate and regular rhythm. Heart sounds: Normal heart sounds. Pulmonary:      Effort: Pulmonary effort is normal.      Breath sounds: Normal breath sounds. Abdominal:      General: Abdomen is flat.       Palpations: Abdomen is soft. Musculoskeletal:         General: Normal range of motion. Cervical back: Normal range of motion and neck supple. No rigidity or tenderness. Lymphadenopathy:      Cervical: No cervical adenopathy. Skin:     General: Skin is warm and dry. Neurological:      General: No focal deficit present. Mental Status: She is alert and oriented to person, place, and time. Psychiatric:         Mood and Affect: Mood normal.         Behavior: Behavior normal.           Results:  Labs:  Component  Ref Range & Units 11/6/23  6:43 AM 5/18/23  9:57 AM 1/27/23 12:58 PM 11/30/22 12:54 PM 8/30/22 11:20 AM 9/28/20  1:23 PM 6/11/20  7:13 AM   Thyroglobulin-ANNELISE  1.5 - 38.5 ng/mL <0.1           1 Result Note            Component  Ref Range & Units 11/6/23  6:43 AM 5/18/23  9:57 AM 1/27/23 12:58 PM 11/30/22 12:54 PM 8/30/22 11:20 AM 9/28/20  1:23 PM 9/28/20  1:23 PM   Thyroglobulin Ab  0.0 - 0.9 IU/mL <1.0              This SmartLink has not been configured with any valid records. Lab Results   Component Value Date    GTL4JHYIGCDO 0.111 (L) 05/18/2023    TSH 0.519 11/06/2023    D5UQXMJ 12.64 (H) 05/18/2023       Imaging  NECK ULTRASOUND     INDICATION:  Z85.850: Personal history of malignant neoplasm of thyroid. C73: Malignant neoplasm of thyroid gland. C77.0: Secondary and unspecified malignant neoplasm of lymph nodes of head, face and neck. COMPARISON: Ultrasound 4/24/2023     FINDINGS:     Ultrasound of the thyroidectomy bed and cervical lymph node chains was performed with a high frequency linear transducer. There is no suspicion of recurrent mass in the thyroidectomy bed. Lymph nodes maintain normal morphologic contour, echogenicity and short axis dimensions of less than 0.7 cm. No evidence for microcalcification or focal nodularity. IMPRESSION:     No evidence of recurrent or metastatic disease. Workstation performed: [id number]     No results found.   I reviewed the above laboratory and imaging data. Discussion/Summary: History of metastatic thyroid cancer, status post thyroidectomy, neck transition, and radioactive iodine. Doing well, presently HOLLI. Follow-up in 6 months with blood work and ultrasound for surveillance per guidelines.

## 2024-05-25 LAB
T4 FREE SERPL-MCNC: 1.7 NG/DL (ref 0.82–1.77)
THYROGLOB AB SERPL-ACNC: <1 IU/ML (ref 0–0.9)
THYROGLOB SERPL-MCNC: <0.1 NG/ML (ref 1.5–38.5)
TSH SERPL DL<=0.005 MIU/L-ACNC: 0.18 UIU/ML (ref 0.45–4.5)

## 2024-06-01 DIAGNOSIS — C73 THYROID CANCER (HCC): ICD-10-CM

## 2024-06-01 DIAGNOSIS — E89.0 POSTOPERATIVE HYPOTHYROIDISM: ICD-10-CM

## 2024-06-01 RX ORDER — LEVOTHYROXINE SODIUM 112 UG/1
112 TABLET ORAL DAILY
Qty: 90 TABLET | Refills: 1 | Status: SHIPPED | OUTPATIENT
Start: 2024-06-01

## 2024-06-11 ENCOUNTER — TELEPHONE (OUTPATIENT)
Dept: SURGICAL ONCOLOGY | Facility: CLINIC | Age: 84
End: 2024-06-11

## 2024-06-11 NOTE — TELEPHONE ENCOUNTER
Called and spoke to patient. Patient cancelled US 6/10/24. Patient states after having any diagnostics done, her insurance [employer] sends a no coverage service statement. Patient disagrees paying the bill and was sent to the collection service agency. At this time patient doesn't want any diagnostics done, until she gets things taking care of through the insurance company.

## 2024-06-19 ENCOUNTER — TELEPHONE (OUTPATIENT)
Dept: SURGICAL ONCOLOGY | Facility: CLINIC | Age: 84
End: 2024-06-19

## 2024-06-19 NOTE — TELEPHONE ENCOUNTER
Spoke to patient about scheduling her US. She reports that she had an issue with her last bill for the US and does not want to schedule another one until this is cleared up. She spoke to someone in billing about a week ago and was told they would call her back. Message was sent to billing asking about this. I made patient aware that I would call her back when I found out more information. She verbalized understanding.

## 2024-07-02 ENCOUNTER — TELEPHONE (OUTPATIENT)
Dept: SURGICAL ONCOLOGY | Facility: CLINIC | Age: 84
End: 2024-07-02

## 2024-07-02 DIAGNOSIS — Z08 ENCOUNTER FOR FOLLOW-UP SURVEILLANCE OF THYROID CANCER: Primary | ICD-10-CM

## 2024-07-02 DIAGNOSIS — Z85.850 ENCOUNTER FOR FOLLOW-UP SURVEILLANCE OF THYROID CANCER: Primary | ICD-10-CM

## 2024-07-02 DIAGNOSIS — Z85.850 HISTORY OF THYROID CANCER: ICD-10-CM

## 2024-07-02 NOTE — TELEPHONE ENCOUNTER
LM asking patient to call back to the hopeline with an update on her financial situation. We need to schedule US and f/u with Dr. Corral. She is past due for surveillance, history of thyroid cancer

## 2024-07-02 NOTE — TELEPHONE ENCOUNTER
Patient called back and I scheduled her for US 7/17 and follow up with Dr. Corral on 7/24. She verbalized understanding of dates, times and places.

## 2024-07-17 ENCOUNTER — HOSPITAL ENCOUNTER (OUTPATIENT)
Dept: ULTRASOUND IMAGING | Facility: HOSPITAL | Age: 84
Discharge: HOME | End: 2024-07-17
Attending: SURGERY
Payer: COMMERCIAL

## 2024-07-17 DIAGNOSIS — Z85.850 ENCOUNTER FOR FOLLOW-UP SURVEILLANCE OF THYROID CANCER: ICD-10-CM

## 2024-07-17 DIAGNOSIS — Z08 ENCOUNTER FOR FOLLOW-UP SURVEILLANCE OF THYROID CANCER: ICD-10-CM

## 2024-07-17 PROCEDURE — 76536 US EXAM OF HEAD AND NECK: CPT

## 2024-07-30 ENCOUNTER — TELEPHONE (OUTPATIENT)
Dept: SURGICAL ONCOLOGY | Facility: CLINIC | Age: 84
End: 2024-07-30

## 2024-07-30 ENCOUNTER — TELEMEDICINE (OUTPATIENT)
Dept: SURGICAL ONCOLOGY | Facility: CLINIC | Age: 84
End: 2024-07-30
Payer: COMMERCIAL

## 2024-07-30 DIAGNOSIS — Z85.850 HISTORY OF THYROID CANCER: ICD-10-CM

## 2024-07-30 DIAGNOSIS — Z08 ENCOUNTER FOR FOLLOW-UP SURVEILLANCE OF THYROID CANCER: Primary | ICD-10-CM

## 2024-07-30 DIAGNOSIS — Z85.850 ENCOUNTER FOR FOLLOW-UP SURVEILLANCE OF THYROID CANCER: Primary | ICD-10-CM

## 2024-07-30 PROCEDURE — 99213 OFFICE O/P EST LOW 20 MIN: CPT | Performed by: SURGERY

## 2024-07-30 NOTE — TELEPHONE ENCOUNTER
Called patient to get her checked out after appointment patient was unavailable. I left a detailed voicemail for patient that she needs a 6 mo f/u as well as US. You are scheduled for US on January 30, 2024 @ 10 am if this date and time doesn't work please call to reschedule [phone]. You will then have a follow-up appointment on February 11, 2025 @ 930 am , if this date and time doesn't work please call our office back at [phone].      Thank you take care.

## 2024-07-30 NOTE — PROGRESS NOTES
Virtual Brief Visit  Name: Bessy Kaplan      : 1940      MRN: [id number]  Encounter Provider: Wilfredo Corral MD  Encounter Date: 2024   Encounter department: [facility] [place]    This Visit is being completed by telephone. The Patient is located at Home and in the following state in which I hold an active license [place]    The patient was identified by name and date of birth. Bessy Kaplan was informed that this is a telemedicine visit and that the visit is being conducted through Telephone.  My office door was closed. No one else was in the room.  She acknowledged consent and understanding of privacy and security of the video platform. The patient has agreed to participate and understands they can discontinue the visit at any time.    Patient is aware this is a billable service.     Assessment & Plan   1. Encounter for follow-up surveillance of thyroid cancer  -     US head neck lymph node mapping; Future; Expected date: 2025  -     Thyroglobulin Panel; Future; Expected date: 2025  -     Thyroid Panel With TSH; Future; Expected date: 2025  -     Thyroid Panel With TSH  2. History of thyroid cancer        History of Present Illness   HPI    Oncology History   History of thyroid cancer   2020 Surgery    Slides (3) labeled  from [facility] (obtained on: 2020)   A. 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      2022 Biopsy    Neck, Soft Tissue Mass, Left neck Ultrasound-guided Needle Biopsy (3 slides [id number] [facility], collected 2022):  - Papillary thyroid carcinoma, focally invading fibrous tissue.         2022 Surgery    Thyroid, Thyroid Right Lobectomy Subtotal Left Lobectomy Thyroid Tissue ( 6 slides [id number] [facility], collected 16):  A) Right thyroid lobectomy and sub total  left lobectomy (6 slides):  - Papillary thyroid carcinoma, tall cell variant, with necrosis (high grade papillary carcinoma),3.2 cm, unencapsulated.  - Surgical margins appear negative for carcinoma.   - Background nodular adenomatous hyperplasia.     B. Thyroid, :   - Nodular adenomatous hyperplasia of thyroid.    ----  Slides reviewed by [facility] pathology:  1. Slides (6) labeled [id number] from [facility] (obtained on: 11/17/2016):   A. Thyroid, right, lobectomy:   -  Papillary thyroid carcinoma, tall cell variant, with necrosis (high grade papillary carcinoma),3.2 cm, unencapsulated,    - Background thyroid tissue with adenomatous nodule.     B. Thyroid, left lobe, subtotal lobectomy:   - Thyroid tissue with adenomatous nodule.      9/6/2022 Biopsy    Lymph Node, Right, Zone 5A (ThinPrep and smear preparations):  Conclusive Evidence of Malignancy  Metastatic carcinoma., consistent with Metastasis from the patient know papillary thyroid carcinoma      10/18/2022 Surgery    A. Right modified neck dissection-level 3 lymph nodes:      - Metastatic papillary thyroid carcinoma in one of five lymph nodes (1/5)      - Size or largest metastatic deposit: 2.8cm       - Extranodal extension: Present      B. Right level 2-neck:      - Three lymph nodes, negative for malignancy (0/3)     C. Left level 5 lymph node:      - Metastatic papillary thyroid carcinoma in one of two lymph nodes (1/2)      - Size or largest metastatic deposit: 3.5cm       - Extranodal extension: Present        Patient is an 84-year-old woman status post thyroidectomy at outside hospital, more recently status post neck dissection 2 years ago for metastatic nodes.    Today's phone call was to go over her to send surveillance ultrasound and blood work.  Both ultrasound and blood work were not suggestive of tumor recurrence.  We discussed surveillance moving forward.  Plan on ultrasound and blood work in 6 months along with follow-up per  protocol.      Visit Time  Total Visit Duration: 10 minutes

## 2024-08-08 ENCOUNTER — OFFICE VISIT (OUTPATIENT)
Dept: ENDOCRINOLOGY | Facility: HOSPITAL | Age: 84
End: 2024-08-08
Payer: COMMERCIAL

## 2024-08-08 VITALS
DIASTOLIC BLOOD PRESSURE: 78 MMHG | OXYGEN SATURATION: 98 % | SYSTOLIC BLOOD PRESSURE: 128 MMHG | BODY MASS INDEX: 34.73 KG/M2 | WEIGHT: 161 LBS | HEIGHT: 57 IN | HEART RATE: 69 BPM

## 2024-08-08 DIAGNOSIS — C73 PAPILLARY CARCINOMA OF THYROID (HCC): ICD-10-CM

## 2024-08-08 DIAGNOSIS — C73 THYROID CANCER (HCC): Primary | ICD-10-CM

## 2024-08-08 DIAGNOSIS — E89.0 POSTOPERATIVE HYPOTHYROIDISM: ICD-10-CM

## 2024-08-08 PROCEDURE — 99215 OFFICE O/P EST HI 40 MIN: CPT | Performed by: STUDENT IN AN ORGANIZED HEALTH CARE EDUCATION/TRAINING PROGRAM

## 2024-08-08 RX ORDER — MELOXICAM 15 MG/1
15 TABLET ORAL DAILY
COMMUNITY
Start: 2024-08-05

## 2024-08-08 NOTE — PROGRESS NOTES
Established Patient Progress Note       No chief complaint on file.       History of Present Illness:     Bessy Kaplan is a 84 y.o. 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 Patient initially only underwent complete total thyroidectomy but later d/t tall cell variant and also found to have positive LN concerning for metastatic disease underwent LN resection and MONTEZ ablation in Jan 2023. Being followed for post surgical hypothyroidism and PTC since. Previously seen by Dr Austyn tineo, this is a transfer of care visit, all previous notes/labs reviewed for the visit.  Other Pmhx of osteoporosis managed by PCP currently not on any Tx. Transfering care closer d/t unable to drive to     Patients last labs from 05/24 shows neg Tg tumor marker, TSH 0.1 and T4 1.4.   Last US with LN mapping 07/24 was neg for any recurrence.     Patient reports feeling fine, denies any dysphagia, odynophagia, change in voice, neck swelling, diarrhea, constipation, weight changes.   Takes her levothyroxine 112mcg daily.   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)    Osteoporosis      Past Medical History:   Diagnosis Date    Asthma     Breast cancer (HCC)     Left    Chronic pain disorder     Dental crowns present     Dry eyes     Eczema     Fibromyalgia, primary     Generalized arthritis     Glaucoma     History of kidney stones     History of radiation therapy     [place] (hard of hearing)     Hyperlipidemia     Hypertension     Limb alert care status     No BP-IV Left arm    Macular degeneration     Osteopenia     Risk for falls      "Shortness of breath     per pt--\"exertional and not new\"    Thyroid cancer (HCC)     Uses walker     occas and occas cane    Wears glasses       Past Surgical History:   Procedure Laterality Date    BREAST LUMPECTOMY Left     2009    CATARACT EXTRACTION Bilateral     CHOLECYSTECTOMY      LARYNGOSCOPY N/A 10/18/2022    Procedure: FLEXIBLE LARYNGOSCOPY;  Surgeon: Wilfredo Corral MD;  [facility];  Service: Surgical Oncology    RADICAL NECK DISSECTION Bilateral 10/18/2022    Procedure: BILATERAL NECK DISSECTION, FLEXIBLE LARYNGOSCOPY;  Surgeon: Wilfredo Corral MD;  [facility];  Service: Surgical Oncology    THYROIDECTOMY, PARTIAL  2016    \"[facility]\"    US GUIDED LYMPH NODE BIOPSY LEFT  09/06/2022    US GUIDED THYROID BIOPSY  11/07/2016      Family History   Problem Relation Age of Onset    Heart disease Mother     Heart disease Father     Lupus Sister      Social History     Tobacco Use    Smoking status: Never    Smokeless tobacco: Never   Substance Use Topics    Alcohol use: Not Currently     Allergies   Allergen Reactions    Amoxicillin GI Intolerance     Nausea    Erythromycin Nausea Only       Current Outpatient Medications:     acetaminophen (TYLENOL) 325 mg tablet, Take 2 tablets (650 mg total) by mouth every 6 (six) hours as needed for mild pain, Disp: , Rfl: 0    CALCIUM PO, Take by mouth, Disp: , Rfl:     dorzolamide-timolol (COSOPT) 22.3-6.8 MG/ML ophthalmic solution, INSTILL 1 DROP LEFT EYE 2 TIMES A DAY, Disp: , Rfl:     fluticasone-salmeterol (ADVAIR HFA) 115-21 MCG/ACT inhaler, Inhale 2 puffs daily Rinse mouth after use., Disp: , Rfl:     GLUCOSAMINE-CHONDROITIN PO, Take by mouth in the morning, Disp: , Rfl:     Ibuprofen (Advil) 200 MG CAPS, Take by mouth, Disp: , Rfl:     ibuprofen (MOTRIN) 200 mg tablet, Take 3 tablets (600 mg total) by mouth every 6 (six) hours as needed for mild pain Per pt usually once a day will stop one week before surgery per surgeon Do not start " before October 22, 2022. (Patient not taking: Reported on 5/25/2023), Disp: , Rfl: 0    levothyroxine 112 mcg tablet, TAKE 1 TABLET BY MOUTH EVERY DAY, Disp: 90 tablet, Rfl: 1    lisinopril (ZESTRIL) 20 mg tablet, Take 20 mg by mouth daily, Disp: , Rfl:     LUMIGAN 0.01 % ophthalmic drops, INSTILL 1 DROP INTO LEFT EYE IN THE EVENING, Disp: , Rfl:     moxifloxacin (VIGAMOX) 0.5 % ophthalmic solution, INSTILL 1 DROP INTO LEFT EYE EVERY TWO HOURS WHILE AWAKE FOR 2 DAYS, Disp: , Rfl:     Multiple Vitamin (multivitamin) tablet, Take 1 tablet by mouth daily, Disp: , Rfl:     Omega-3 Fatty Acids (FISH OIL PO), Take by mouth, Disp: , Rfl:     omeprazole (PriLOSEC) 10 mg delayed release capsule, Take 10 mg by mouth daily, Disp: , Rfl:     prednisoLONE acetate (PRED FORTE) 1 % ophthalmic suspension, Administer 1 drop to the right eye 2 (two) times a day (Patient not taking: Reported on 11/27/2023), Disp: , Rfl:     simvastatin (ZOCOR) 20 mg tablet, Take 20 mg by mouth daily at bedtime, Disp: , Rfl: 1    traMADol (Ultram) 50 mg tablet, Take 1 tablet (50 mg total) by mouth every 6 (six) hours as needed for moderate pain (Patient not taking: Reported on 5/25/2023), Disp: 10 tablet, Rfl: 0    Review of Systems   Constitutional:  Negative for fatigue and unexpected weight change.   HENT:  Negative for trouble swallowing and voice change.    Eyes:  Negative for photophobia and visual disturbance.   Respiratory:  Negative for choking and shortness of breath.    Gastrointestinal:  Negative for constipation and diarrhea.   Endocrine: Negative for cold intolerance and heat intolerance.   Musculoskeletal:  Negative for arthralgias and myalgias.   Skin:  Negative for rash.       Physical Exam:  There is no height or weight on file to calculate BMI.  There were no vitals taken for this visit.   Wt Readings from Last 3 Encounters:   12/12/23 70.3 kg (155 lb)   11/27/23 73 kg (161 lb)   05/25/23 71.6 kg (157 lb 12.8 oz)       Physical  Exam  Constitutional:       Appearance: Normal appearance. She is obese.   Cardiovascular:      Rate and Rhythm: Normal rate and regular rhythm.      Pulses: Normal pulses.   Pulmonary:      Effort: Pulmonary effort is normal.   Abdominal:      General: Abdomen is flat. Bowel sounds are normal.      Palpations: Abdomen is soft.   Skin:     General: Skin is warm and dry.      Capillary Refill: Capillary refill takes less than 2 seconds.   Neurological:      General: No focal deficit present.      Mental Status: She is alert and oriented to person, place, and time.   Psychiatric:         Mood and Affect: Mood normal.         Labs:    Latest Reference Range & Units 05/18/23 09:57 11/06/23 06:43 05/24/24 07:12   TSH, POC 0.450 - 4.500 uIU/mL  0.519 0.183 (L)   TSH 3RD GENERATON 0.450 - 4.500 uIU/mL 0.111 (L)     FREE T4 0.82 - 1.77 ng/dL 1.35 (H) 1.75 1.70   T4, TOTAL 6.09 - 12.23 ug/dL 12.64 (H)     THYROGLOBULIN AB 0.0 - 0.9 IU/mL <1.0 <1.0 <1.0   Thyroglobulin-ANNELISE 1.5 - 38.5 ng/mL <0.1 (L) <0.1 (L) <0.1 (L)   T3 UPTAKE RATIO 24 - 39 % 29     (L): Data is abnormally low  (H): Data is abnormally high    NECK ULTRASOUND     INDICATION: Z08: Encounter for follow-up examination after completed treatment for malignant neoplasm  Z85.850: Personal history of malignant neoplasm of thyroid.     COMPARISON: 11/2/2023     FINDINGS:     Ultrasound of the thyroidectomy bed and cervical lymph node chains was performed with a high frequency linear transducer.     There is no suspicion of recurrent mass in the thyroidectomy bed.     Lymph nodes maintain normal morphologic contour, echogenicity and short axis dimensions of less than 0.7 cm. No evidence for microcalcification or focal nodularity.     IMPRESSION:     No evidence of recurrent or metastatic disease.  Impression & Plan:    Problem List Items Addressed This Visit    None      No orders of the defined types were placed in this encounter.      There are no Patient  Instructions on file for this visit.    Patient is a 84yF with tall cell variant PTC since 2016, s/p MONTEZ ablation oct 2023 currently on levothyroxine, Osteoporosis who presents for follow up today     1) PTC:- Given tall cell variant, at intermittent risk and hence goal TSH 0.1-0.5. Currently TSH is at goal and Tg tumor marker neg and US also neg. Recommend repeat labs for tumor marker and TSH in 6 months. US per ENT    2) Post surgical hypothyroidism- clinically feels well, TSH low at goal and hence c/w levothyroxine 112mcg and repeat labs in 6 months     Discussed with the patient and all questioned fully answered. She will call me if any problems arise.    Follow-up appointment in 6 months     Counseled patient on diagnostic results, prognosis, risk and benefit of treatment options, instruction for management, importance of treatment compliance, Risk  factor reduction and impressions    I have spent a total time of 40 minutes in caring for this patient on the day of the visit/encounter including Risks and benefits of tx options, Patient and family education, Documenting in the medical record, and Obtaining or reviewing history  .     Frannie Briggs MD

## 2024-09-25 DIAGNOSIS — C73 THYROID CANCER (HCC): ICD-10-CM

## 2024-09-25 DIAGNOSIS — E89.0 POSTOPERATIVE HYPOTHYROIDISM: ICD-10-CM

## 2024-09-25 RX ORDER — LEVOTHYROXINE SODIUM 112 UG/1
112 TABLET ORAL DAILY
Qty: 90 TABLET | Refills: 1 | Status: SHIPPED | OUTPATIENT
Start: 2024-09-25

## 2025-01-30 ENCOUNTER — HOSPITAL ENCOUNTER (OUTPATIENT)
Dept: ULTRASOUND IMAGING | Facility: HOSPITAL | Age: 85
End: 2025-01-30
Attending: SURGERY
Payer: COMMERCIAL

## 2025-01-30 ENCOUNTER — APPOINTMENT (OUTPATIENT)
Dept: LAB | Facility: HOSPITAL | Age: 85
End: 2025-01-30
Attending: SURGERY
Payer: COMMERCIAL

## 2025-01-30 DIAGNOSIS — C73 THYROID CANCER (HCC): ICD-10-CM

## 2025-01-30 DIAGNOSIS — C73 PAPILLARY CARCINOMA OF THYROID (HCC): ICD-10-CM

## 2025-01-30 DIAGNOSIS — Z85.850 ENCOUNTER FOR FOLLOW-UP SURVEILLANCE OF THYROID CANCER: ICD-10-CM

## 2025-01-30 DIAGNOSIS — Z08 ENCOUNTER FOR FOLLOW-UP SURVEILLANCE OF THYROID CANCER: ICD-10-CM

## 2025-01-30 DIAGNOSIS — E89.0 POSTOPERATIVE HYPOTHYROIDISM: ICD-10-CM

## 2025-01-30 LAB
T4 FREE SERPL-MCNC: 1.34 NG/DL (ref 0.61–1.12)
T4 SERPL-MCNC: 12.7 UG/DL (ref 6.09–12.23)
TSH SERPL DL<=0.05 MIU/L-ACNC: 0.3 UIU/ML (ref 0.45–4.5)

## 2025-01-30 PROCEDURE — 86800 THYROGLOBULIN ANTIBODY: CPT

## 2025-01-30 PROCEDURE — 36415 COLL VENOUS BLD VENIPUNCTURE: CPT

## 2025-01-30 PROCEDURE — 84436 ASSAY OF TOTAL THYROXINE: CPT

## 2025-01-30 PROCEDURE — 84439 ASSAY OF FREE THYROXINE: CPT

## 2025-01-30 PROCEDURE — 84432 ASSAY OF THYROGLOBULIN: CPT

## 2025-01-30 PROCEDURE — 76536 US EXAM OF HEAD AND NECK: CPT

## 2025-01-30 PROCEDURE — 84479 ASSAY OF THYROID (T3 OR T4): CPT

## 2025-01-30 PROCEDURE — 84443 ASSAY THYROID STIM HORMONE: CPT

## 2025-01-31 ENCOUNTER — RESULTS FOLLOW-UP (OUTPATIENT)
Dept: ENDOCRINOLOGY | Facility: HOSPITAL | Age: 85
End: 2025-01-31

## 2025-01-31 LAB
T3RU NFR SERPL: 31 % (ref 24–39)
THYROGLOB AB SERPL-ACNC: <1 IU/ML (ref 0–0.9)
THYROGLOB SERPL-MCNC: 0.1 NG/ML (ref 1.5–38.5)

## 2025-03-07 DIAGNOSIS — C73 THYROID CANCER (HCC): ICD-10-CM

## 2025-03-07 DIAGNOSIS — E89.0 POSTOPERATIVE HYPOTHYROIDISM: ICD-10-CM

## 2025-03-07 RX ORDER — LEVOTHYROXINE SODIUM 112 UG/1
112 TABLET ORAL DAILY
Qty: 90 TABLET | Refills: 1 | Status: SHIPPED | OUTPATIENT
Start: 2025-03-07
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
## 2025-03-11 ENCOUNTER — OFFICE VISIT (OUTPATIENT)
Dept: ENDOCRINOLOGY | Facility: HOSPITAL | Age: 85
End: 2025-03-11
Payer: COMMERCIAL

## 2025-03-11 ENCOUNTER — TELEPHONE (OUTPATIENT)
Age: 85
End: 2025-03-11

## 2025-03-11 VITALS
HEIGHT: 57 IN | SYSTOLIC BLOOD PRESSURE: 122 MMHG | BODY MASS INDEX: 34.52 KG/M2 | WEIGHT: 160 LBS | OXYGEN SATURATION: 97 % | DIASTOLIC BLOOD PRESSURE: 78 MMHG | HEART RATE: 74 BPM

## 2025-03-11 DIAGNOSIS — E89.0 POSTOPERATIVE HYPOTHYROIDISM: ICD-10-CM

## 2025-03-11 DIAGNOSIS — C73 PAPILLARY CARCINOMA OF THYROID (HCC): ICD-10-CM

## 2025-03-11 DIAGNOSIS — Z85.850 HISTORY OF THYROID CANCER: ICD-10-CM

## 2025-03-11 DIAGNOSIS — C73 THYROID CANCER (HCC): Primary | ICD-10-CM

## 2025-03-11 DIAGNOSIS — M81.0 OSTEOPOROSIS, UNSPECIFIED OSTEOPOROSIS TYPE, UNSPECIFIED PATHOLOGICAL FRACTURE PRESENCE: ICD-10-CM

## 2025-03-11 PROCEDURE — 99214 OFFICE O/P EST MOD 30 MIN: CPT | Performed by: STUDENT IN AN ORGANIZED HEALTH CARE EDUCATION/TRAINING PROGRAM

## 2025-03-11 PROCEDURE — G2211 COMPLEX E/M VISIT ADD ON: HCPCS | Performed by: STUDENT IN AN ORGANIZED HEALTH CARE EDUCATION/TRAINING PROGRAM

## 2025-03-11 RX ORDER — ALBUTEROL SULFATE 90 UG/1
1 INHALANT RESPIRATORY (INHALATION) DAILY
COMMUNITY
Start: 2025-03-06

## 2025-03-11 NOTE — TELEPHONE ENCOUNTER
Pt called to confirm the new insurance was entered in the system. Reporting that the office charged her $50 but on the insurance card it reports speciality copay $30. Advised pt to call insurance to see if she has to pay a deductible prior to getting those copay prices. Aware to call us back with any questions or concerns.

## 2025-03-12 ENCOUNTER — OFFICE VISIT (OUTPATIENT)
Dept: SURGICAL ONCOLOGY | Facility: CLINIC | Age: 85
End: 2025-03-12
Payer: COMMERCIAL

## 2025-03-12 VITALS
RESPIRATION RATE: 18 BRPM | BODY MASS INDEX: 34.09 KG/M2 | HEART RATE: 69 BPM | WEIGHT: 158 LBS | OXYGEN SATURATION: 99 % | HEIGHT: 57 IN | DIASTOLIC BLOOD PRESSURE: 74 MMHG | TEMPERATURE: 97.7 F | SYSTOLIC BLOOD PRESSURE: 124 MMHG

## 2025-03-12 DIAGNOSIS — Z85.850 HISTORY OF THYROID CANCER: ICD-10-CM

## 2025-03-12 DIAGNOSIS — Z85.850 ENCOUNTER FOR FOLLOW-UP SURVEILLANCE OF THYROID CANCER: Primary | ICD-10-CM

## 2025-03-12 DIAGNOSIS — C77.0 MALIGNANT NEOPLASM OF THYROID METASTATIC TO LYMPH NODE OF NECK (HCC): ICD-10-CM

## 2025-03-12 DIAGNOSIS — Z08 ENCOUNTER FOR FOLLOW-UP SURVEILLANCE OF THYROID CANCER: Primary | ICD-10-CM

## 2025-03-12 DIAGNOSIS — C73 MALIGNANT NEOPLASM OF THYROID METASTATIC TO LYMPH NODE OF NECK (HCC): ICD-10-CM

## 2025-03-12 PROCEDURE — 99214 OFFICE O/P EST MOD 30 MIN: CPT | Performed by: SURGERY

## 2025-03-12 NOTE — PROGRESS NOTES
Surgical Oncology follow-up       [facility] [place]  [address] [facility] [address]  [phone]    Bessy Hobbs  1940  [id number]  [facility] [place]  [address] [facility] [address]  [phone]    Chief Complaint   Patient presents with    Office Visit       Assessment/Plan:    No problem-specific Assessment & Plan notes found for this encounter.       Diagnoses and all orders for this visit:    Encounter for follow-up surveillance of thyroid cancer    History of thyroid cancer      Advance Care Planning/Advance Directives:  Discussed disease status, cancer treatment plans and/or cancer treatment goals with the patient.     Oncology History   History of thyroid cancer   1/21/2020 Surgery    Slides (3) labeled  from [facility] (obtained on: 01/21/2020)   A. 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      8/8/2022 Biopsy    Neck, Soft Tissue Mass, Left neck Ultrasound-guided Needle Biopsy (3 slides [id number] [facility], collected 7/12/2022):  - Papillary thyroid carcinoma, focally invading fibrous tissue.         8/26/2022 Surgery    Thyroid, Thyroid Right Lobectomy Subtotal Left Lobectomy Thyroid Tissue ( 6 slides [id number] [facility], collected 11/17/16):  A) Right thyroid lobectomy and sub total left lobectomy (6 slides):  - Papillary thyroid carcinoma, tall cell variant, with necrosis (high grade papillary carcinoma),3.2 cm, unencapsulated.  - Surgical margins appear negative for carcinoma.   - Background nodular adenomatous hyperplasia.     B. Thyroid, :   - Nodular adenomatous hyperplasia of thyroid.    ----  Slides reviewed by [facility] pathology:  1. Slides (6) labeled [id number] from [facility] (obtained on: 11/17/2016):    A. Thyroid, right, lobectomy:   -  Papillary thyroid carcinoma, tall cell variant, with necrosis (high grade papillary carcinoma),3.2 cm, unencapsulated,    - Background thyroid tissue with adenomatous nodule.     B. Thyroid, left lobe, subtotal lobectomy:   - Thyroid tissue with adenomatous nodule.      9/6/2022 Biopsy    Lymph Node, Right, Zone 5A (ThinPrep and smear preparations):  Conclusive Evidence of Malignancy  Metastatic carcinoma., consistent with Metastasis from the patient know papillary thyroid carcinoma      10/18/2022 Surgery    A. Right modified neck dissection-level 3 lymph nodes:      - Metastatic papillary thyroid carcinoma in one of five lymph nodes (1/5)      - Size or largest metastatic deposit: 2.8cm       - Extranodal extension: Present      B. Right level 2-neck:      - Three lymph nodes, negative for malignancy (0/3)     C. Left level 5 lymph node:      - Metastatic papillary thyroid carcinoma in one of two lymph nodes (1/2)      - Size or largest metastatic deposit: 3.5cm       - Extranodal extension: Present          History of Present Illness: 85-year-old woman with history of stage II thyroid cancer status post thyroidectomy in 2022 at an outside hospital.  She had subsequent lymph node metastases and dissection on the right side and underwent neck dissection.  She is here for surveillance visit.    Review of Systems   Constitutional: Negative.    HENT: Negative.     Eyes: Negative.    Respiratory: Negative.     Cardiovascular: Negative.    Gastrointestinal: Negative.    Endocrine: Negative.    Genitourinary: Negative.    Musculoskeletal: Negative.    Skin: Negative.    Allergic/Immunologic: Negative.    Neurological: Negative.    Hematological: Negative.    Psychiatric/Behavioral: Negative.     All other systems reviewed and are negative.        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)    Osteoporosis     Past Medical History:   Diagnosis Date    Asthma     Breast cancer (HCC)     Left    Chronic pain disorder     Dental crowns present     Dry eyes     Eczema     Fibromyalgia, primary     Generalized arthritis     Glaucoma     History of kidney stones     History of radiation therapy     Council (hard of hearing)     Hyperlipidemia     Hypertension     Limb alert care status     No BP-IV Left arm    Macular degeneration     Osteopenia     Risk for falls     Shortness of breath     per pt--\"exertional and not new\"    Thyroid cancer (HCC)     Uses walker     occas and occas cane    Wears glasses      Past Surgical History:   Procedure Laterality Date    BREAST LUMPECTOMY Left     2009    CATARACT EXTRACTION Bilateral     CHOLECYSTECTOMY      LARYNGOSCOPY N/A 10/18/2022    Procedure: FLEXIBLE LARYNGOSCOPY;  Surgeon: Wilfredo Corral MD;  Location: [facility];  Service: Surgical Oncology    RADICAL NECK DISSECTION Bilateral 10/18/2022    Procedure: BILATERAL NECK DISSECTION, FLEXIBLE LARYNGOSCOPY;  Surgeon: Wilfredo Corral MD;  Location: [facility];  Service: Surgical Oncology    THYROIDECTOMY  2019    THYROIDECTOMY, PARTIAL  2016    \"[facility]\"    US GUIDED LYMPH NODE BIOPSY LEFT  09/06/2022    US GUIDED THYROID BIOPSY  11/07/2016     Family History   Problem Relation Age of Onset    Heart disease Mother     Heart disease Father     Lupus Sister      Social History     Socioeconomic History    Marital status:      Spouse name: Not on file    Number of children: Not on file    Years of education: Not on file    Highest education level: Not on file   Occupational History    Not on file   Tobacco Use    Smoking status: Never    Smokeless tobacco: Never   Vaping Use    Vaping status: Never Used   Substance and Sexual Activity    Alcohol use: Not Currently    Drug use: " Never    Sexual activity: Not on file     Comment: defer   Other Topics Concern    Not on file   Social History Narrative    Not on file     Social Drivers of Health     Financial Resource Strain: Not on file   Food Insecurity: No Food Insecurity (10/19/2022)    Hunger Vital Sign     Worried About Running Out of Food in the Last Year: Never true     Ran Out of Food in the Last Year: Never true   Transportation Needs: No Transportation Needs (10/19/2022)    PRAPARE - Transportation     Lack of Transportation (Medical): No     Lack of Transportation (Non-Medical): No   Physical Activity: Not on file   Stress: Not on file   Social Connections: Not on file   Intimate Partner Violence: Not on file   Housing Stability: Low Risk  (10/19/2022)    Housing Stability Vital Sign     Unable to Pay for Housing in the Last Year: No     Number of Places Lived in the Last Year: 1     Unstable Housing in the Last Year: No       Current Outpatient Medications:     acetaminophen (TYLENOL) 325 mg tablet, Take 2 tablets (650 mg total) by mouth every 6 (six) hours as needed for mild pain, Disp: , Rfl: 0    albuterol (PROVENTIL HFA,VENTOLIN HFA) 90 mcg/act inhaler, Inhale 1 puff in the morning, Disp: , Rfl:     CALCIUM PO, Take by mouth, Disp: , Rfl:     dorzolamide-timolol (COSOPT) 22.3-6.8 MG/ML ophthalmic solution, INSTILL 1 DROP LEFT EYE 2 TIMES A DAY, Disp: , Rfl:     GLUCOSAMINE-CHONDROITIN PO, Take by mouth in the morning, Disp: , Rfl:     Ibuprofen (Advil) 200 MG CAPS, Take by mouth, Disp: , Rfl:     levothyroxine 112 mcg tablet, TAKE 1 TABLET BY MOUTH EVERY DAY, Disp: 90 tablet, Rfl: 1    lisinopril (ZESTRIL) 20 mg tablet, Take 20 mg by mouth daily, Disp: , Rfl:     LUMIGAN 0.01 % ophthalmic drops, INSTILL 1 DROP INTO LEFT EYE IN THE EVENING, Disp: , Rfl:     moxifloxacin (VIGAMOX) 0.5 % ophthalmic solution, INSTILL 1 DROP INTO LEFT EYE EVERY TWO HOURS WHILE AWAKE FOR 2 DAYS, Disp: , Rfl:     Multiple Vitamin (multivitamin)  tablet, Take 1 tablet by mouth daily, Disp: , Rfl:     Omega-3 Fatty Acids (FISH OIL PO), Take by mouth, Disp: , Rfl:     omeprazole (PriLOSEC) 10 mg delayed release capsule, Take 10 mg by mouth daily, Disp: , Rfl:     simvastatin (ZOCOR) 20 mg tablet, Take 20 mg by mouth daily at bedtime, Disp: , Rfl: 1    fluticasone-salmeterol (ADVAIR HFA) 115-21 MCG/ACT inhaler, Inhale 2 puffs daily Rinse mouth after use. (Patient not taking: Reported on 3/11/2025), Disp: , Rfl:     ibuprofen (MOTRIN) 200 mg tablet, Take 3 tablets (600 mg total) by mouth every 6 (six) hours as needed for mild pain Per pt usually once a day will stop one week before surgery per surgeon Do not start before October 22, 2022. (Patient not taking: Reported on 5/25/2023), Disp: , Rfl: 0    meloxicam (MOBIC) 15 mg tablet, Take 15 mg by mouth daily (Patient not taking: Reported on 3/12/2025), Disp: , Rfl:     prednisoLONE acetate (PRED FORTE) 1 % ophthalmic suspension, Administer 1 drop to the right eye 2 (two) times a day (Patient not taking: Reported on 11/27/2023), Disp: , Rfl:     traMADol (Ultram) 50 mg tablet, Take 1 tablet (50 mg total) by mouth every 6 (six) hours as needed for moderate pain (Patient not taking: Reported on 5/25/2023), Disp: 10 tablet, Rfl: 0  Allergies   Allergen Reactions    Amoxicillin GI Intolerance     Nausea    Erythromycin Nausea Only     There were no vitals filed for this visit.    Physical Exam  Vitals reviewed.   Constitutional:       Appearance: Normal appearance.   HENT:      Head: Normocephalic and atraumatic.      Right Ear: External ear normal.      Left Ear: External ear normal.      Nose: Nose normal.   Eyes:      Extraocular Movements: Extraocular movements intact.      Pupils: Pupils are equal, round, and reactive to light.   Cardiovascular:      Rate and Rhythm: Regular rhythm.      Pulses: Normal pulses.      Heart sounds: Normal heart sounds.   Pulmonary:      Effort: Pulmonary effort is normal.       Breath sounds: Normal breath sounds.   Abdominal:      General: Abdomen is flat.      Palpations: Abdomen is soft.   Musculoskeletal:         General: Normal range of motion.      Cervical back: Normal range of motion and neck supple. No rigidity or tenderness.   Lymphadenopathy:      Cervical: No cervical adenopathy.   Skin:     General: Skin is warm and dry.   Neurological:      General: No focal deficit present.      Mental Status: She is alert and oriented to person, place, and time.   Psychiatric:         Mood and Affect: Mood normal.         Behavior: Behavior normal.         Pathology:  none    Labs:  Lab Results   Component Value Date    [id number] 0.300 (L) 01/30/2025    TSH 0.183 (L) 05/24/2024    [id number] 12.70 (H) 01/30/2025   2 Result Notes       View Follow-Up Encounter            Component  Ref Range & Units (hover) 1/30/25 11:08 AM 5/24/24  7:12 AM 11/6/23  6:43 AM 5/18/23  9:57 AM 1/27/23 12:58 PM 11/30/22 12:54 PM 8/30/22 11:20 AM   Thyroglobulin-ANNELISE 0.1                  Imaging    NECK ULTRASOUND     INDICATION: Z08: Encounter for follow-up examination after completed treatment for malignant neoplasm  Z85.850: Personal history of malignant neoplasm of thyroid.     COMPARISON: 7/17/2024; 9/3/2022     FINDINGS:     Ultrasound of the thyroidectomy bed and cervical lymph node chains was performed with a high frequency linear transducer.     There is no suspicion of recurrent mass in the thyroidectomy bed. Heterogeneous material in the left thyroidectomy bed inferiorly likely related to scar tissue, clips and adjacent muscle tissue in this region. No suspicious mass.     Lymph nodes maintain normal morphologic contour, echogenicity and short axis dimensions of less than 0.7 cm. No evidence for microcalcification or focal nodularity.     IMPRESSION:     No evidence of recurrent or metastatic disease.        Workstation performed: [id number]  No results found.  I reviewed the above laboratory and  imaging data.    Discussion/Summary: 85-year-old woman, history of stage II thyroid cancer, presently HOLLI.  Follow-up in 1 year with blood work and ultrasound at that time for surveillance per protocol.

## 2025-03-12 NOTE — TELEPHONE ENCOUNTER
"When the patient checked in yesterday, [facility] Medicare replacement was verified by [employer]TE, but the [place] [employer] card that she gave us (with the new ID# as of 25)  was rejected.  Checked on [facility], but could not find the new [place] [employer] ID# anywhere.  We used the [facility] covereage to the visit & collected the $50 copay that was pulled from the [facility] RTE response.    Called today & spoke to the patient.  She explained that [facility] \"scammed her\" and took away her insurance.  She received a letter from them stating that her coverage would  as of 25.  Her [employer] coverage was supposed to be effective as of 3-1-25.    I told her that the problem is probably because neither insurance company has updated their records yet.  Told her I would reach out to our manager & see if there was any way that they could defer the 3-11-25 claim until her insurance gets straightened out.    Per Pita:    We can't hold off on her claim. What will happen is that if in fact her [facility] is not active, they will deny the claim. If the information in the system is then the [employer] plan, they will try to bill the claim there.  We will have to wait to see if the claim gets rejected. She should actually reach out to the insurance and confirm the start date and end date of each insurance carrier.      "

## 2025-03-13 NOTE — TELEPHONE ENCOUNTER
Left detailed message for patient.  Relayed what Pita said regarding the claim & advised her to reach out to both insurances companies to verify the start & end date of each one.

## 2025-08-11 PROBLEM — I10 HTN (HYPERTENSION): Status: ACTIVE | Noted: 2025-08-11

## 2025-08-11 PROBLEM — M17.9 DJD (DEGENERATIVE JOINT DISEASE) OF KNEE: Status: ACTIVE | Noted: 2025-08-11

## 2025-08-11 PROBLEM — C50.912: Status: ACTIVE | Noted: 2025-08-11

## 2025-08-11 PROBLEM — E78.00 HYPERCHOLESTEROLEMIA: Status: ACTIVE | Noted: 2025-08-11

## 2025-08-11 PROBLEM — Z17.0: Status: ACTIVE | Noted: 2025-08-11

## 2025-08-11 PROBLEM — R49.0 HOARSENESS, CHRONIC: Status: ACTIVE | Noted: 2025-08-11

## 2025-08-11 PROBLEM — H35.3231 EXUDATIVE AGE-RELATED MACULAR DEGENERATION OF BOTH EYES WITH ACTIVE CHOROIDAL NEOVASCULARIZATION (HCC): Status: ACTIVE | Noted: 2025-08-11

## 2025-08-11 PROBLEM — E66.812 OBESITY, CLASS II, BMI 35-39.9: Status: ACTIVE | Noted: 2025-08-11

## 2025-08-11 PROBLEM — M85.80 OSTEOPENIA: Status: ACTIVE | Noted: 2025-08-11

## 2025-08-11 PROBLEM — J45.909 ASTHMA: Status: ACTIVE | Noted: 2025-08-11

## 2025-08-11 PROBLEM — J30.9 ALLERGIC RHINITIS: Status: ACTIVE | Noted: 2025-08-11

## 2025-08-21 ENCOUNTER — VBI (OUTPATIENT)
Dept: ADMINISTRATIVE | Facility: OTHER | Age: 85
End: 2025-08-21

## (undated) DEVICE — DISPOSABLE EQUIPMENT COVER: Brand: SMALL TOWEL DRAPE

## (undated) DEVICE — SUT SILK 2-0 18 IN A185H

## (undated) DEVICE — AMBU ASCOPE 4 RHINOLARYNGO SLIM SINGLE-USE, FLEXIBLE RHINOLARYNGOSCOPE STERILE FROM PACKAGE. INSERTION CORD DIAMETER 3.0 MM, LENGHT OF 300 MM. AND A 130-DEGREE BENDING ANGLE. MINIMUM PURCHASE 5 PCS = 1 BOX: Brand: ASCOPE™ 4 RHINOLARYNGO SLIM

## (undated) DEVICE — INTENDED FOR TISSUE SEPARATION, AND OTHER PROCEDURES THAT REQUIRE A SHARP SURGICAL BLADE TO PUNCTURE OR CUT.: Brand: BARD-PARKER ® CARBON RIB-BACK BLADES

## (undated) DEVICE — DRAPE SURGIKIT SADDLE BAG

## (undated) DEVICE — GLOVE SRG BIOGEL ECLIPSE 7

## (undated) DEVICE — MEDI-VAC YANK SUCT HNDL W/TPRD BULBOUS TIP: Brand: CARDINAL HEALTH

## (undated) DEVICE — SUT VICRYL 4-0 PS-2 27 IN J426H

## (undated) DEVICE — INTENDED FOR TISSUE SEPARATION, AND OTHER PROCEDURES THAT REQUIRE A SHARP SURGICAL BLADE TO PUNCTURE OR CUT.: Brand: BARD-PARKER SAFETY BLADES SIZE 15, STERILE

## (undated) DEVICE — BULB SYRINGE,IRRIGATION WITH PROTECTIVE CAP: Brand: DOVER

## (undated) DEVICE — SUT SILK 3-0 SH CR/8 18 IN C013D

## (undated) DEVICE — SUT SILK 2-0 SH CR/8 18 IN C012D

## (undated) DEVICE — PLUMEPEN PRO 10FT

## (undated) DEVICE — TIBURON SPLIT SHEET: Brand: CONVERTORS

## (undated) DEVICE — ELECTRODE BLADE MOD E-Z CLEAN 2.5IN 6.4CM -0012M

## (undated) DEVICE — SUT ETHILON 3-0 FS-1 18 IN 663G

## (undated) DEVICE — BIPOLAR CORD DISP

## (undated) DEVICE — DRAPE SHEET THREE QUARTER

## (undated) DEVICE — ADHESIVE SKIN HIGH VISCOSITY EXOFIN 1ML

## (undated) DEVICE — DRAIN JACKSON PRATT 10FR 1/8END: Brand: CARDINAL HEALTH

## (undated) DEVICE — PACK UNIVERSAL NECK

## (undated) DEVICE — JACKSON-PRATT 100CC BULB RESERVOIR: Brand: CARDINAL HEALTH

## (undated) DEVICE — SUT VICRYL 3-0 18 IN J110T

## (undated) DEVICE — TRAY FOLEY 16FR URIMETER SURESTEP

## (undated) DEVICE — GLOVE INDICATOR PI UNDERGLOVE SZ 7 BLUE

## (undated) DEVICE — LIGACLIP MCA MULTIPLE CLIP APPLIERS, 20 MEDIUM CLIPS: Brand: LIGACLIP

## (undated) DEVICE — SURGICEL 4 X 8

## (undated) DEVICE — NEEDLE 25G X 1 1/2

## (undated) DEVICE — SUT MONOCRYL 5-0 P-3 18 IN Y493G

## (undated) DEVICE — VESSEL LOOPS X-RAY DETECTABLE: Brand: DEROYAL

## (undated) DEVICE — 3M™ STERI-STRIP™ COMPOUND BENZOIN TINCTURE 40 BAGS/CARTON 4 CARTONS/CASE C1544: Brand: 3M™ STERI-STRIP™

## (undated) DEVICE — SUT VICRYL 3-0 SH 27 IN J416H

## (undated) DEVICE — LIGACLIP MCA MULTIPLE CLIP APPLIERS, 20 SMALL CLIPS: Brand: LIGACLIP